# Patient Record
Sex: MALE | Race: WHITE | NOT HISPANIC OR LATINO | Employment: OTHER | RURAL
[De-identification: names, ages, dates, MRNs, and addresses within clinical notes are randomized per-mention and may not be internally consistent; named-entity substitution may affect disease eponyms.]

---

## 2023-07-29 ENCOUNTER — HOSPITAL ENCOUNTER (INPATIENT)
Facility: HOSPITAL | Age: 38
LOS: 5 days | Discharge: HOME | DRG: 558 | End: 2023-08-03
Attending: INTERNAL MEDICINE | Admitting: INTERNAL MEDICINE

## 2023-07-29 DIAGNOSIS — R34 OLIGURIA: ICD-10-CM

## 2023-07-29 DIAGNOSIS — M62.82 RHABDOMYOLYSIS: ICD-10-CM

## 2023-07-29 DIAGNOSIS — R79.89 ELEVATED TROPONIN I LEVEL: ICD-10-CM

## 2023-07-29 DIAGNOSIS — R07.9 CHEST PAIN: ICD-10-CM

## 2023-07-29 DIAGNOSIS — M62.82 NON-TRAUMATIC RHABDOMYOLYSIS: Primary | ICD-10-CM

## 2023-07-29 DIAGNOSIS — I10 ESSENTIAL HYPERTENSION: ICD-10-CM

## 2023-07-29 DIAGNOSIS — R79.89 ELEVATED TROPONIN: ICD-10-CM

## 2023-07-29 DIAGNOSIS — I10 PRIMARY HYPERTENSION: ICD-10-CM

## 2023-07-29 PROBLEM — M25.551 BILATERAL HIP PAIN: Status: ACTIVE | Noted: 2023-07-29

## 2023-07-29 PROBLEM — M25.552 BILATERAL HIP PAIN: Status: ACTIVE | Noted: 2023-07-29

## 2023-07-29 PROBLEM — N17.9 AKI (ACUTE KIDNEY INJURY): Status: ACTIVE | Noted: 2023-07-29

## 2023-07-29 PROBLEM — R74.01 TRANSAMINITIS: Status: ACTIVE | Noted: 2023-07-29

## 2023-07-29 PROBLEM — F10.20 UNCOMPLICATED ALCOHOL DEPENDENCE: Status: ACTIVE | Noted: 2023-07-29

## 2023-07-29 LAB
ALBUMIN SERPL BCP-MCNC: 2.9 G/DL (ref 3.5–5)
ALBUMIN/GLOB SERPL: 0.8 {RATIO}
ALP SERPL-CCNC: 52 U/L (ref 45–115)
ALT SERPL W P-5'-P-CCNC: 452 U/L (ref 16–61)
ANION GAP SERPL CALCULATED.3IONS-SCNC: 15 MMOL/L (ref 7–16)
AST SERPL W P-5'-P-CCNC: 1842 U/L (ref 15–37)
BASOPHILS # BLD AUTO: 0.03 K/UL (ref 0–0.2)
BASOPHILS NFR BLD AUTO: 0.1 % (ref 0–1)
BILIRUB SERPL-MCNC: 1.2 MG/DL (ref ?–1.2)
BUN SERPL-MCNC: 24 MG/DL (ref 7–18)
BUN/CREAT SERPL: 15 (ref 6–20)
CALCIUM SERPL-MCNC: 7.4 MG/DL (ref 8.5–10.1)
CHLORIDE SERPL-SCNC: 92 MMOL/L (ref 98–107)
CO2 SERPL-SCNC: 21 MMOL/L (ref 21–32)
CREAT SERPL-MCNC: 1.56 MG/DL (ref 0.7–1.3)
DIFFERENTIAL METHOD BLD: ABNORMAL
EGFR (NO RACE VARIABLE) (RUSH/TITUS): 58 ML/MIN/1.73M2
EOSINOPHIL # BLD AUTO: 0.09 K/UL (ref 0–0.5)
EOSINOPHIL NFR BLD AUTO: 0.4 % (ref 1–4)
ERYTHROCYTE [DISTWIDTH] IN BLOOD BY AUTOMATED COUNT: 12.7 % (ref 11.5–14.5)
GLOBULIN SER-MCNC: 3.5 G/DL (ref 2–4)
GLUCOSE SERPL-MCNC: 144 MG/DL (ref 74–106)
HCT VFR BLD AUTO: 43.3 % (ref 40–54)
HGB BLD-MCNC: 15.4 G/DL (ref 13.5–18)
IMM GRANULOCYTES # BLD AUTO: 0.16 K/UL (ref 0–0.04)
IMM GRANULOCYTES NFR BLD: 0.7 % (ref 0–0.4)
LACTATE SERPL-SCNC: 2.6 MMOL/L (ref 0.4–2)
LACTATE SERPL-SCNC: 3.2 MMOL/L (ref 0.4–2)
LYMPHOCYTES # BLD AUTO: 0.86 K/UL (ref 1–4.8)
LYMPHOCYTES NFR BLD AUTO: 3.9 % (ref 27–41)
MCH RBC QN AUTO: 31.8 PG (ref 27–31)
MCHC RBC AUTO-ENTMCNC: 35.6 G/DL (ref 32–36)
MCV RBC AUTO: 89.3 FL (ref 80–96)
MONOCYTES # BLD AUTO: 1.7 K/UL (ref 0–0.8)
MONOCYTES NFR BLD AUTO: 7.8 % (ref 2–6)
MPC BLD CALC-MCNC: 9.3 FL (ref 9.4–12.4)
MYOGLOBIN SERPL-MCNC: ABNORMAL NG/ML (ref 16–116)
NEUTROPHILS # BLD AUTO: 18.99 K/UL (ref 1.8–7.7)
NEUTROPHILS NFR BLD AUTO: 87.1 % (ref 53–65)
NRBC # BLD AUTO: 0 X10E3/UL
NRBC, AUTO (.00): 0 %
PLATELET # BLD AUTO: 236 K/UL (ref 150–400)
POTASSIUM SERPL-SCNC: 4.3 MMOL/L (ref 3.5–5.1)
PROT SERPL-MCNC: 6.4 G/DL (ref 6.4–8.2)
RBC # BLD AUTO: 4.85 M/UL (ref 4.6–6.2)
SODIUM SERPL-SCNC: 124 MMOL/L (ref 136–145)
TROPONIN I SERPL DL<=0.01 NG/ML-MCNC: 57 PG/ML
WBC # BLD AUTO: 21.83 K/UL (ref 4.5–11)

## 2023-07-29 PROCEDURE — 93005 ELECTROCARDIOGRAM TRACING: CPT

## 2023-07-29 PROCEDURE — 82550 ASSAY OF CK (CPK): CPT

## 2023-07-29 PROCEDURE — 63600175 PHARM REV CODE 636 W HCPCS

## 2023-07-29 PROCEDURE — 85025 COMPLETE CBC W/AUTO DIFF WBC: CPT

## 2023-07-29 PROCEDURE — 83605 ASSAY OF LACTIC ACID: CPT

## 2023-07-29 PROCEDURE — 83874 ASSAY OF MYOGLOBIN: CPT

## 2023-07-29 PROCEDURE — 11000001 HC ACUTE MED/SURG PRIVATE ROOM

## 2023-07-29 PROCEDURE — 84484 ASSAY OF TROPONIN QUANT: CPT

## 2023-07-29 PROCEDURE — 25000003 PHARM REV CODE 250: Performed by: FAMILY MEDICINE

## 2023-07-29 PROCEDURE — 93010 EKG 12-LEAD: ICD-10-PCS | Mod: ,,, | Performed by: HOSPITALIST

## 2023-07-29 PROCEDURE — 25000003 PHARM REV CODE 250

## 2023-07-29 PROCEDURE — 93010 ELECTROCARDIOGRAM REPORT: CPT | Mod: ,,, | Performed by: HOSPITALIST

## 2023-07-29 PROCEDURE — 80053 COMPREHEN METABOLIC PANEL: CPT

## 2023-07-29 RX ORDER — POLYETHYLENE GLYCOL 3350 17 G/17G
17 POWDER, FOR SOLUTION ORAL 2 TIMES DAILY PRN
Status: DISCONTINUED | OUTPATIENT
Start: 2023-07-29 | End: 2023-08-03 | Stop reason: HOSPADM

## 2023-07-29 RX ORDER — SODIUM CHLORIDE 0.9 % (FLUSH) 0.9 %
10 SYRINGE (ML) INJECTION EVERY 12 HOURS PRN
Status: DISCONTINUED | OUTPATIENT
Start: 2023-07-29 | End: 2023-08-03 | Stop reason: HOSPADM

## 2023-07-29 RX ORDER — IBUPROFEN 200 MG
16 TABLET ORAL
Status: DISCONTINUED | OUTPATIENT
Start: 2023-07-29 | End: 2023-08-03 | Stop reason: HOSPADM

## 2023-07-29 RX ORDER — MORPHINE SULFATE 2 MG/ML
2 INJECTION, SOLUTION INTRAMUSCULAR; INTRAVENOUS ONCE
Status: COMPLETED | OUTPATIENT
Start: 2023-07-29 | End: 2023-07-29

## 2023-07-29 RX ORDER — HYDROCODONE BITARTRATE AND ACETAMINOPHEN 10; 325 MG/1; MG/1
1 TABLET ORAL EVERY 6 HOURS PRN
Status: DISCONTINUED | OUTPATIENT
Start: 2023-07-29 | End: 2023-07-30

## 2023-07-29 RX ORDER — ACETAMINOPHEN 325 MG/1
650 TABLET ORAL EVERY 6 HOURS PRN
Status: DISCONTINUED | OUTPATIENT
Start: 2023-07-29 | End: 2023-07-30

## 2023-07-29 RX ORDER — TALC
6 POWDER (GRAM) TOPICAL NIGHTLY PRN
Status: DISCONTINUED | OUTPATIENT
Start: 2023-07-29 | End: 2023-08-03 | Stop reason: HOSPADM

## 2023-07-29 RX ORDER — MORPHINE SULFATE 4 MG/ML
4 INJECTION, SOLUTION INTRAMUSCULAR; INTRAVENOUS ONCE
Status: COMPLETED | OUTPATIENT
Start: 2023-07-29 | End: 2023-07-29

## 2023-07-29 RX ORDER — MUPIROCIN 20 MG/G
OINTMENT TOPICAL 2 TIMES DAILY
Status: DISCONTINUED | OUTPATIENT
Start: 2023-07-29 | End: 2023-08-03 | Stop reason: HOSPADM

## 2023-07-29 RX ORDER — LORAZEPAM 2 MG/ML
1 INJECTION INTRAMUSCULAR EVERY 4 HOURS PRN
Status: DISCONTINUED | OUTPATIENT
Start: 2023-07-29 | End: 2023-08-01

## 2023-07-29 RX ORDER — ONDANSETRON 2 MG/ML
4 INJECTION INTRAMUSCULAR; INTRAVENOUS EVERY 8 HOURS PRN
Status: DISCONTINUED | OUTPATIENT
Start: 2023-07-29 | End: 2023-08-03 | Stop reason: HOSPADM

## 2023-07-29 RX ORDER — GLUCAGON 1 MG
1 KIT INJECTION
Status: DISCONTINUED | OUTPATIENT
Start: 2023-07-29 | End: 2023-08-03 | Stop reason: HOSPADM

## 2023-07-29 RX ORDER — SODIUM CHLORIDE, SODIUM LACTATE, POTASSIUM CHLORIDE, CALCIUM CHLORIDE 600; 310; 30; 20 MG/100ML; MG/100ML; MG/100ML; MG/100ML
INJECTION, SOLUTION INTRAVENOUS CONTINUOUS
Status: DISCONTINUED | OUTPATIENT
Start: 2023-07-29 | End: 2023-08-02

## 2023-07-29 RX ORDER — IBUPROFEN 200 MG
24 TABLET ORAL
Status: DISCONTINUED | OUTPATIENT
Start: 2023-07-29 | End: 2023-08-03 | Stop reason: HOSPADM

## 2023-07-29 RX ORDER — NALOXONE HCL 0.4 MG/ML
0.02 VIAL (ML) INJECTION
Status: DISCONTINUED | OUTPATIENT
Start: 2023-07-29 | End: 2023-08-03 | Stop reason: HOSPADM

## 2023-07-29 RX ADMIN — HYDROCODONE BITARTRATE AND ACETAMINOPHEN 1 TABLET: 10; 325 TABLET ORAL at 06:07

## 2023-07-29 RX ADMIN — SODIUM CHLORIDE, POTASSIUM CHLORIDE, SODIUM LACTATE AND CALCIUM CHLORIDE: 600; 310; 30; 20 INJECTION, SOLUTION INTRAVENOUS at 07:07

## 2023-07-29 RX ADMIN — MORPHINE SULFATE 4 MG: 4 INJECTION, SOLUTION INTRAMUSCULAR; INTRAVENOUS at 10:07

## 2023-07-29 RX ADMIN — MORPHINE SULFATE 2 MG: 2 INJECTION, SOLUTION INTRAMUSCULAR; INTRAVENOUS at 08:07

## 2023-07-29 RX ADMIN — MUPIROCIN: 20 OINTMENT TOPICAL at 08:07

## 2023-07-29 RX ADMIN — SODIUM CHLORIDE, POTASSIUM CHLORIDE, SODIUM LACTATE AND CALCIUM CHLORIDE: 600; 310; 30; 20 INJECTION, SOLUTION INTRAVENOUS at 10:07

## 2023-07-29 NOTE — H&P
[facility] - [address] [place] [address] Medicine  History & Physical    Patient Name: Raj Watkins  MRN: [id number]  Patient Class: IP- Inpatient  Admission Date: 7/29/2023  Attending Physician: Teto Azar DO   Primary Care Provider: Primary Doctor No         Patient information was obtained from patient, past medical records and ER records.     Subjective:     Principal Problem:Rhabdomyolysis    Chief Complaint:   Chief Complaint   Patient presents with    Hip Pain     Rhabdomyolysis        HPI: Patient is a 38 year old male with a past medical history of diabetes mellitus, low back pain, and hypertension who is a direct admit of [facility]. Patient presents with bilateral hip pain following a fall. He states that last night he starting thing about an ongoing family issue and started drinking. He also endorsed using methamphetamines. Of note, patient has a history of IVDU specifically methamphetamine, as well as, alcohol use. He states he has been drinking about 7-9 beers per night. Last night, after drinking and using methamphetamines he got up to go to the restroom and fell into the tub. Denies any head trauma. Patient estimates he was unconscious throughout the night (approximately 4 + hours) in a hot bathroom. When he awoke his legs were crossed and hanging over the edge of the tub. Upon attempting to sit up on the edge of the tube he noted severe bilateral hip pain. EMS was alerted and he was transport to [facility] emergency department.     In the ED at [facility], his vital were /81, T 36.4 C. Labs were significant for WBC 24575, , CK > 1000, myoglobin > 1000. Patient was given 3L NS, magnesium sulfate 4g IV, and cath.     In the ambulance, patient put out 1700 ml of dark brown urine, was given Fentanyl 100, and Zofran 4mg. Patient was admitted directly to the hospialist service at [facility]           No past medical history on  file.    No past surgical history on file.    Review of patient's allergies indicates:  Not on File    No current facility-administered medications on file prior to encounter.     No current outpatient medications on file prior to encounter.     Family History    None       Tobacco Use    Smoking status: Not on file    Smokeless tobacco: Not on file   Substance and Sexual Activity    Alcohol use: Not on file    Drug use: Not on file    Sexual activity: Not on file     Review of Systems   Constitutional:  Negative for activity change, appetite change and fever.   HENT:  Negative for postnasal drip, rhinorrhea and sinus pressure.    Respiratory:  Negative for cough, chest tightness and shortness of breath.    Cardiovascular:  Positive for leg swelling. Negative for chest pain and palpitations.   Gastrointestinal:  Negative for abdominal distention, abdominal pain, constipation and diarrhea.   Genitourinary:  Positive for difficulty urinating. Negative for enuresis and frequency.   Musculoskeletal:  Positive for arthralgias, back pain, gait problem and myalgias.   Skin:  Negative for rash and wound.   Allergic/Immunologic: Negative for environmental allergies and food allergies.   Neurological:  Positive for weakness. Negative for speech difficulty, light-headedness and headaches.   Psychiatric/Behavioral:  Negative for agitation, behavioral problems and hallucinations. The patient is nervous/anxious. The patient is not hyperactive.    All other systems reviewed and are negative.    Objective:     Vital Signs (Most Recent):  Temp: 98 °F (36.7 °C) (07/29/23 1700)  Pulse: 87 (07/29/23 1700)  Resp: 20 (07/29/23 1700)  BP: 131/87 (07/29/23 1700)  SpO2: 96 % (07/29/23 1700) Vital Signs (24h Range):  Temp:  [98 °F (36.7 °C)] 98 °F (36.7 °C)  Pulse:  [87] 87  Resp:  [20] 20  SpO2:  [96 %] 96 %  BP: (131)/(87) 131/87        There is no height or weight on file to calculate BMI.     Physical Exam  Vitals reviewed.    Constitutional:       General: He is awake. He is not in acute distress.     Appearance: Normal appearance. He is well-developed, well-groomed and overweight. He is ill-appearing.   HENT:      Head: Normocephalic and atraumatic.      Mouth/Throat:      Mouth: Mucous membranes are dry.      Pharynx: Oropharynx is clear.   Eyes:      General:         Right eye: No discharge.         Left eye: No discharge.      Conjunctiva/sclera: Conjunctivae normal.      Pupils: Pupils are equal, round, and reactive to light.   Cardiovascular:      Rate and Rhythm: Normal rate and regular rhythm.      Pulses: Normal pulses.      Heart sounds: Normal heart sounds.   Pulmonary:      Effort: Pulmonary effort is normal. No respiratory distress.      Breath sounds: Normal breath sounds.   Abdominal:      General: Bowel sounds are normal.      Palpations: Abdomen is soft.      Tenderness: There is no abdominal tenderness.   Musculoskeletal:      Right hip: Tenderness present. No bony tenderness. Decreased range of motion.      Left hip: Tenderness present. No bony tenderness. Decreased range of motion.      Right upper leg: Tenderness present. No lacerations or bony tenderness.      Left upper leg: Tenderness present. No lacerations or bony tenderness.      Right lower leg: Swelling and tenderness present. No deformity. Edema present.      Left lower leg: Swelling and tenderness present. No deformity. Edema present.   Skin:     General: Skin is warm and dry.   Neurological:      Mental Status: He is alert and oriented to person, place, and time.   Psychiatric:         Mood and Affect: Mood normal.         Behavior: Behavior is cooperative.              CRANIAL NERVES     CN III, IV, VI   Pupils are equal, round, and reactive to light.       Significant Labs: All pertinent labs within the past 24 hours have been reviewed.    Significant Imaging: I have reviewed all pertinent imaging results/findings within the past 24  hours.    Assessment/Plan:     * Rhabdomyolysis  Transfer from [facility], Myoglobin >1000, CK >1000  Reported 3L NS given at [facility]  Trend CK and Myoglobin  IVF resuscitation,  ml/hr    Elevated troponin I level  Troponin at [facility] 94.0  Trend troponin   No chest pain      YANNICK (acute kidney injury)  Patient with acute kidney injury/acute renal failure likely due to pre-renal azotemia due to dehydration YANNICK is currently stable. Baseline creatinine unknown - Labs reviewed- Renal function/electrolytes with CrCl cannot be calculated (No successful lab value found.). according to latest data. Monitor urine output and serial BMP and adjust therapy as needed. Avoid nephrotoxins and renally dose meds for GFR listed above.    Creatinine 2.15 at [facility]     Transaminitis  At [facility] AST 2026,   Repeat CMP      Bilateral hip pain  Patient reportedly fell at home in Bathtub for unknown amount of time  Back and Hip pain along with rhabdomyolysis  CT Cervical, Thoracic, Lumbar and Pelvis without contrast - pending      Primary hypertension  Holding home lisinopril-HCTZ due to YANNICK  BP well controlled      Uncomplicated alcohol dependence  Patient reportedly drinking >6 beers per day  Ativan PRN for withdrawal   CIWA score 1 point      VTE Risk Mitigation (From admission, onward)         Ordered     IP VTE LOW RISK PATIENT  Once         07/29/23 1745     Place sequential compression device  Until discontinued         07/29/23 1745                           Ivy Howell MD  Department of Hospital Medicine  [facility] - [address] [place] [address]

## 2023-07-29 NOTE — SUBJECTIVE & OBJECTIVE
No past medical history on file.    No past surgical history on file.    Review of patient's allergies indicates:  Not on File    No current facility-administered medications on file prior to encounter.     No current outpatient medications on file prior to encounter.     Family History    None       Tobacco Use    Smoking status: Not on file    Smokeless tobacco: Not on file   Substance and Sexual Activity    Alcohol use: Not on file    Drug use: Not on file    Sexual activity: Not on file     Review of Systems   Constitutional:  Negative for activity change, appetite change and fever.   HENT:  Negative for postnasal drip, rhinorrhea and sinus pressure.    Respiratory:  Negative for cough, chest tightness and shortness of breath.    Cardiovascular:  Positive for leg swelling. Negative for chest pain and palpitations.   Gastrointestinal:  Negative for abdominal distention, abdominal pain, constipation and diarrhea.   Genitourinary:  Positive for difficulty urinating. Negative for enuresis and frequency.   Musculoskeletal:  Positive for arthralgias, back pain, gait problem and myalgias.   Skin:  Negative for rash and wound.   Allergic/Immunologic: Negative for environmental allergies and food allergies.   Neurological:  Positive for weakness. Negative for speech difficulty, light-headedness and headaches.   Psychiatric/Behavioral:  Negative for agitation, behavioral problems and hallucinations. The patient is nervous/anxious. The patient is not hyperactive.    All other systems reviewed and are negative.    Objective:     Vital Signs (Most Recent):  Temp: 98 °F (36.7 °C) (07/29/23 1700)  Pulse: 87 (07/29/23 1700)  Resp: 20 (07/29/23 1700)  BP: 131/87 (07/29/23 1700)  SpO2: 96 % (07/29/23 1700) Vital Signs (24h Range):  Temp:  [98 °F (36.7 °C)] 98 °F (36.7 °C)  Pulse:  [87] 87  Resp:  [20] 20  SpO2:  [96 %] 96 %  BP: (131)/(87) 131/87        There is no height or weight on file to calculate BMI.     Physical  Exam  Vitals reviewed.   Constitutional:       General: He is awake. He is not in acute distress.     Appearance: Normal appearance. He is well-developed, well-groomed and overweight. He is ill-appearing.   HENT:      Head: Normocephalic and atraumatic.      Mouth/Throat:      Mouth: Mucous membranes are dry.      Pharynx: Oropharynx is clear.   Eyes:      General:         Right eye: No discharge.         Left eye: No discharge.      Conjunctiva/sclera: Conjunctivae normal.      Pupils: Pupils are equal, round, and reactive to light.   Cardiovascular:      Rate and Rhythm: Normal rate and regular rhythm.      Pulses: Normal pulses.      Heart sounds: Normal heart sounds.   Pulmonary:      Effort: Pulmonary effort is normal. No respiratory distress.      Breath sounds: Normal breath sounds.   Abdominal:      General: Bowel sounds are normal.      Palpations: Abdomen is soft.      Tenderness: There is no abdominal tenderness.   Musculoskeletal:      Right hip: Tenderness present. No bony tenderness. Decreased range of motion.      Left hip: Tenderness present. No bony tenderness. Decreased range of motion.      Right upper leg: Tenderness present. No lacerations or bony tenderness.      Left upper leg: Tenderness present. No lacerations or bony tenderness.      Right lower leg: Swelling and tenderness present. No deformity. Edema present.      Left lower leg: Swelling and tenderness present. No deformity. Edema present.   Skin:     General: Skin is warm and dry.   Neurological:      Mental Status: He is alert and oriented to person, place, and time.   Psychiatric:         Mood and Affect: Mood normal.         Behavior: Behavior is cooperative.              CRANIAL NERVES     CN III, IV, VI   Pupils are equal, round, and reactive to light.       Significant Labs: All pertinent labs within the past 24 hours have been reviewed.    Significant Imaging: I have reviewed all pertinent imaging results/findings within the past  24 hours.

## 2023-07-29 NOTE — ASSESSMENT & PLAN NOTE
Patient with acute kidney injury/acute renal failure likely due to pre-renal azotemia due to dehydration YANNICK is currently stable. Baseline creatinine unknown - Labs reviewed- Renal function/electrolytes with CrCl cannot be calculated (No successful lab value found.). according to latest data. Monitor urine output and serial BMP and adjust therapy as needed. Avoid nephrotoxins and renally dose meds for GFR listed above.    Creatinine 2.15 at [facility]

## 2023-07-29 NOTE — ASSESSMENT & PLAN NOTE
Patient reportedly fell at home in Bathtub for unknown amount of time  Back and Hip pain along with rhabdomyolysis  CT Cervical, Thoracic, Lumbar and Pelvis without contrast - pending

## 2023-07-29 NOTE — HPI
Patient is a 38 year old male with a past medical history of diabetes mellitus, low back pain, and hypertension who is a direct admit of [facility]. Patient presents with bilateral hip pain following a fall. He states that last night he starting thing about an ongoing family issue and started drinking. He also endorsed using methamphetamines. Of note, patient has a history of IVDU specifically methamphetamine, as well as, alcohol use. He states he has been drinking about 7-9 beers per night. Last night, after drinking and using methamphetamines he got up to go to the restroom and fell into the tub. Denies any head trauma. Patient estimates he was unconscious throughout the night (approximately 4 + hours) in a hot bathroom. When he awoke his legs were crossed and hanging over the edge of the tub. Upon attempting to sit up on the edge of the tube he noted severe bilateral hip pain. EMS was alerted and he was transport to [facility] emergency department.     In the ED at [facility], his vital were /81, T 36.4 C. Labs were significant for WBC 69249, , CK > 1000, myoglobin > 1000. Patient was given 3L NS, magnesium sulfate 4g IV, and cath.     In the ambulance, patient put out 1700 ml of dark brown urine, was given Fentanyl 100, and Zofran 4mg. Patient was admitted directly to the hospialist service at [facility]

## 2023-07-29 NOTE — ASSESSMENT & PLAN NOTE
Transfer from [facility], Myoglobin >1000, CK >1000  Reported 3L NS given at [facility]  Trend CK and Myoglobin  IVF resuscitation,  ml/hr

## 2023-07-30 LAB
ALBUMIN SERPL BCP-MCNC: 2.8 G/DL (ref 3.5–5)
ALBUMIN/GLOB SERPL: 0.9 {RATIO}
ALP SERPL-CCNC: 50 U/L (ref 45–115)
ALT SERPL W P-5'-P-CCNC: 486 U/L (ref 16–61)
ANION GAP SERPL CALCULATED.3IONS-SCNC: 12 MMOL/L (ref 7–16)
AORTIC ROOT ANNULUS: 3.95 CM
AORTIC VALVE CUSP SEPERATION: 2.45 CM
AST SERPL W P-5'-P-CCNC: 1747 U/L (ref 15–37)
AV MEAN GRADIENT: 7 MMHG
AV PEAK GRADIENT: 13 MMHG
BASOPHILS # BLD AUTO: 0.03 K/UL (ref 0–0.2)
BASOPHILS NFR BLD AUTO: 0.1 % (ref 0–1)
BILIRUB SERPL-MCNC: 1.2 MG/DL (ref ?–1.2)
BSA FOR ECHO PROCEDURE: 2.52 M2
BUN SERPL-MCNC: 21 MG/DL (ref 7–18)
BUN/CREAT SERPL: 14 (ref 6–20)
CALCIUM SERPL-MCNC: 7.4 MG/DL (ref 8.5–10.1)
CHLORIDE SERPL-SCNC: 92 MMOL/L (ref 98–107)
CK SERPL-CCNC: ABNORMAL U/L (ref 39–308)
CO2 SERPL-SCNC: 26 MMOL/L (ref 21–32)
CREAT SERPL-MCNC: 1.48 MG/DL (ref 0.7–1.3)
CV ECHO LV RWT: 0.35 CM
DIFFERENTIAL METHOD BLD: ABNORMAL
DOP CALC AO PEAK VEL: 1.79 M/S
DOP CALC AO VTI: 28.4 CM
DOP CALC LVOT AREA: 3.4 CM2
DOP CALC LVOT DIAMETER: 2.09 CM
E WAVE DECELERATION TIME: 235.99 MSEC
E/A RATIO: 1.1
E/E' RATIO: 8 M/S
ECHO LV POSTERIOR WALL: 0.88 CM (ref 0.6–1.1)
EGFR (NO RACE VARIABLE) (RUSH/TITUS): 61 ML/MIN/1.73M2
EJECTION FRACTION: 65 %
EOSINOPHIL # BLD AUTO: 0 K/UL (ref 0–0.5)
EOSINOPHIL NFR BLD AUTO: 0 % (ref 1–4)
ERYTHROCYTE [DISTWIDTH] IN BLOOD BY AUTOMATED COUNT: 12.8 % (ref 11.5–14.5)
FRACTIONAL SHORTENING: 34 % (ref 28–44)
GLOBULIN SER-MCNC: 3 G/DL (ref 2–4)
GLUCOSE SERPL-MCNC: 119 MG/DL (ref 74–106)
HCT VFR BLD AUTO: 43.7 % (ref 40–54)
HGB BLD-MCNC: 14.7 G/DL (ref 13.5–18)
IMM GRANULOCYTES # BLD AUTO: 0.11 K/UL (ref 0–0.04)
IMM GRANULOCYTES NFR BLD: 0.5 % (ref 0–0.4)
INTERVENTRICULAR SEPTUM: 1.14 CM (ref 0.6–1.1)
IVC: 1.5 CM
LACTATE SERPL-SCNC: 3 MMOL/L (ref 0.4–2)
LACTATE SERPL-SCNC: 3 MMOL/L (ref 0.4–2)
LEFT INTERNAL DIMENSION IN SYSTOLE: 3.31 CM (ref 2.1–4)
LEFT VENTRICLE DIASTOLIC VOLUME INDEX: 49.03 ML/M2
LEFT VENTRICLE DIASTOLIC VOLUME: 121.1 ML
LEFT VENTRICLE MASS INDEX: 76 G/M2
LEFT VENTRICLE SYSTOLIC VOLUME INDEX: 18 ML/M2
LEFT VENTRICLE SYSTOLIC VOLUME: 44.46 ML
LEFT VENTRICULAR INTERNAL DIMENSION IN DIASTOLE: 5.05 CM (ref 3.5–6)
LEFT VENTRICULAR MASS: 187.47 G
LV LATERAL E/E' RATIO: 8 M/S
LV SEPTAL E/E' RATIO: 8 M/S
LYMPHOCYTES # BLD AUTO: 1.13 K/UL (ref 1–4.8)
LYMPHOCYTES NFR BLD AUTO: 5.5 % (ref 27–41)
MCH RBC QN AUTO: 31.7 PG (ref 27–31)
MCHC RBC AUTO-ENTMCNC: 33.6 G/DL (ref 32–36)
MCV RBC AUTO: 94.2 FL (ref 80–96)
MONOCYTES # BLD AUTO: 1.71 K/UL (ref 0–0.8)
MONOCYTES NFR BLD AUTO: 8.3 % (ref 2–6)
MPC BLD CALC-MCNC: 9.4 FL (ref 9.4–12.4)
MV PEAK A VEL: 0.8 M/S
MV PEAK E VEL: 0.88 M/S
NEUTROPHILS # BLD AUTO: 17.72 K/UL (ref 1.8–7.7)
NEUTROPHILS NFR BLD AUTO: 85.6 % (ref 53–65)
NRBC # BLD AUTO: 0 X10E3/UL
NRBC, AUTO (.00): 0 %
PLATELET # BLD AUTO: 207 K/UL (ref 150–400)
POTASSIUM SERPL-SCNC: 4.1 MMOL/L (ref 3.5–5.1)
PROT SERPL-MCNC: 5.8 G/DL (ref 6.4–8.2)
PV PEAK GRADIENT: 10 MMHG
PV PEAK VELOCITY: 1.6 M/S
RBC # BLD AUTO: 4.64 M/UL (ref 4.6–6.2)
RIGHT ATRIAL AREA: 17 CM2
RIGHT VENTRICLE DIASTOLIC BASEL DIMENSION: 3.1 CM
SODIUM SERPL-SCNC: 126 MMOL/L (ref 136–145)
TDI LATERAL: 0.11 M/S
TDI SEPTAL: 0.11 M/S
TDI: 0.11 M/S
TROPONIN I SERPL DL<=0.01 NG/ML-MCNC: 31.2 PG/ML
WBC # BLD AUTO: 20.7 K/UL (ref 4.5–11)
Z-SCORE OF LEFT VENTRICULAR DIMENSION IN END DIASTOLE: -9.38
Z-SCORE OF LEFT VENTRICULAR DIMENSION IN END SYSTOLE: -6.65

## 2023-07-30 PROCEDURE — 84484 ASSAY OF TROPONIN QUANT: CPT

## 2023-07-30 PROCEDURE — 63600175 PHARM REV CODE 636 W HCPCS

## 2023-07-30 PROCEDURE — 99232 PR SUBSEQUENT HOSPITAL CARE,LEVL II: ICD-10-PCS | Mod: ,,, | Performed by: FAMILY MEDICINE

## 2023-07-30 PROCEDURE — 63600175 PHARM REV CODE 636 W HCPCS: Performed by: FAMILY MEDICINE

## 2023-07-30 PROCEDURE — 83605 ASSAY OF LACTIC ACID: CPT

## 2023-07-30 PROCEDURE — 25000003 PHARM REV CODE 250

## 2023-07-30 PROCEDURE — 25000003 PHARM REV CODE 250: Performed by: FAMILY MEDICINE

## 2023-07-30 PROCEDURE — 99232 SBSQ HOSP IP/OBS MODERATE 35: CPT | Mod: ,,, | Performed by: FAMILY MEDICINE

## 2023-07-30 PROCEDURE — 85025 COMPLETE CBC W/AUTO DIFF WBC: CPT

## 2023-07-30 PROCEDURE — 11000001 HC ACUTE MED/SURG PRIVATE ROOM

## 2023-07-30 PROCEDURE — 80053 COMPREHEN METABOLIC PANEL: CPT

## 2023-07-30 RX ORDER — CHLORDIAZEPOXIDE HYDROCHLORIDE 25 MG/1
25 CAPSULE, GELATIN COATED ORAL EVERY 8 HOURS
Status: DISCONTINUED | OUTPATIENT
Start: 2023-07-30 | End: 2023-07-30

## 2023-07-30 RX ORDER — CLONAZEPAM 0.5 MG/1
2 TABLET ORAL 2 TIMES DAILY
Status: DISCONTINUED | OUTPATIENT
Start: 2023-07-30 | End: 2023-07-30

## 2023-07-30 RX ORDER — CHLORDIAZEPOXIDE HYDROCHLORIDE 25 MG/1
25 CAPSULE, GELATIN COATED ORAL EVERY 6 HOURS
Status: DISCONTINUED | OUTPATIENT
Start: 2023-07-30 | End: 2023-07-31

## 2023-07-30 RX ORDER — MORPHINE SULFATE 2 MG/ML
2 INJECTION, SOLUTION INTRAMUSCULAR; INTRAVENOUS EVERY 4 HOURS PRN
Status: DISCONTINUED | OUTPATIENT
Start: 2023-07-30 | End: 2023-08-02

## 2023-07-30 RX ADMIN — MUPIROCIN: 20 OINTMENT TOPICAL at 09:07

## 2023-07-30 RX ADMIN — CHLORDIAZEPOXIDE HYDROCHLORIDE 25 MG: 25 CAPSULE ORAL at 05:07

## 2023-07-30 RX ADMIN — SODIUM CHLORIDE, POTASSIUM CHLORIDE, SODIUM LACTATE AND CALCIUM CHLORIDE: 600; 310; 30; 20 INJECTION, SOLUTION INTRAVENOUS at 10:07

## 2023-07-30 RX ADMIN — SODIUM CHLORIDE, POTASSIUM CHLORIDE, SODIUM LACTATE AND CALCIUM CHLORIDE: 600; 310; 30; 20 INJECTION, SOLUTION INTRAVENOUS at 02:07

## 2023-07-30 RX ADMIN — MORPHINE SULFATE 2 MG: 2 INJECTION, SOLUTION INTRAMUSCULAR; INTRAVENOUS at 06:07

## 2023-07-30 RX ADMIN — LORAZEPAM 1 MG: 2 INJECTION INTRAMUSCULAR; INTRAVENOUS at 03:07

## 2023-07-30 RX ADMIN — SODIUM CHLORIDE, POTASSIUM CHLORIDE, SODIUM LACTATE AND CALCIUM CHLORIDE: 600; 310; 30; 20 INJECTION, SOLUTION INTRAVENOUS at 03:07

## 2023-07-30 RX ADMIN — CHLORDIAZEPOXIDE HYDROCHLORIDE 25 MG: 25 CAPSULE ORAL at 11:07

## 2023-07-30 RX ADMIN — THIAMINE HYDROCHLORIDE: 100 INJECTION, SOLUTION INTRAMUSCULAR; INTRAVENOUS at 05:07

## 2023-07-30 RX ADMIN — MORPHINE SULFATE 2 MG: 2 INJECTION, SOLUTION INTRAMUSCULAR; INTRAVENOUS at 11:07

## 2023-07-30 RX ADMIN — SODIUM CHLORIDE, POTASSIUM CHLORIDE, SODIUM LACTATE AND CALCIUM CHLORIDE: 600; 310; 30; 20 INJECTION, SOLUTION INTRAVENOUS at 07:07

## 2023-07-30 NOTE — PROGRESS NOTES
[facility] - [address] [place] [address] Medicine  Progress Note    Patient Name: Raj Watkins  MRN: [id number]  Patient Class: IP- Inpatient   Admission Date: 7/29/2023  Length of Stay: 1 days  Attending Physician: Teto Azar DO  Primary Care Provider: Primary Doctor No        Subjective:     Principal Problem:Rhabdomyolysis        HPI:  Patient is a 38 year old male with a past medical history of diabetes mellitus, low back pain, and hypertension who is a direct admit of [facility]. Patient presents with bilateral hip pain following a fall. He states that last night he starting thing about an ongoing family issue and started drinking. He also endorsed using methamphetamines. Of note, patient has a history of IVDU specifically methamphetamine, as well as, alcohol use. He states he has been drinking about 7-9 beers per night. Last night, after drinking and using methamphetamines he got up to go to the restroom and fell into the tub. Denies any head trauma. Patient estimates he was unconscious throughout the night (approximately 4 + hours) in a hot bathroom. When he awoke his legs were crossed and hanging over the edge of the tub. Upon attempting to sit up on the edge of the tube he noted severe bilateral hip pain. EMS was alerted and he was transport to [facility] emergency department.     In the ED at [facility], his vital were /81, T 36.4 C. Labs were significant for WBC 56435, , CK > 1000, myoglobin > 1000. Patient was given 3L NS, magnesium sulfate 4g IV, and cath.     In the ambulance, patient put out 1700 ml of dark brown urine, was given Fentanyl 100, and Zofran 4mg. Patient was admitted directly to the hospialist service at [facility]           Overview/Hospital Course:  No notes on file    No new subjective & objective note has been filed under this hospital service since the last note was generated.      Assessment/Plan:      *  Rhabdomyolysis  Transfer from [facility], Myoglobin >1000, CK >1000  Reported 3L NS given at [facility]  Trend CK and Myoglobin  IVF resuscitation,  ml/hr    7/30  - repeat labs showed ck 66,000+, myoglobin 23,000+ with elevated liver enzymes (, ALT 1,842) and elevate wbc  - given 4L LR at 250/hr; con't iv hydration      Elevated troponin I level  Troponin at [facility] 94.0  Trend troponin   No chest pain    7/30  - repeat troponins trending down  - no chest pain   - echo showed normal right ventricle and aortic valve with no pericardial effusion  - telemetry showed normal sinus rhythm, con't monitoring    YANNICK (acute kidney injury)  Patient with acute kidney injury/acute renal failure likely due to pre-renal azotemia due to dehydration YANNICK is currently stable. Baseline creatinine unknown - Labs reviewed- Renal function/electrolytes with Estimated Creatinine Clearance: 93.5 mL/min (A) (based on SCr of 1.48 mg/dL (H)). according to latest data. Monitor urine output and serial BMP and adjust therapy as needed. Avoid nephrotoxins and renally dose meds for GFR listed above.    Creatinine 2.15 at [facility]     7/30  - likely secondary to rhabdomyolysis vs dehydration  - con't ivf  - cr stable     Transaminitis  At [facility] AST 2026,   Repeat CMP      Bilateral hip pain  7/30  Patient reportedly fell at home in Bathtub for unknown amount of time  Back and Hip pain along with rhabdomyolysis  CT Cervical, Thoracic and Pelvis without contrast - negative for any acute process.   Lumbar CT - Multilevel degenerative change of the lumbar spine.       Primary hypertension  7/30  Holding home lisinopril-HCTZ due to YANNICK  BP well controlled      Uncomplicated alcohol dependence  Patient reportedly drinking >6 beers per day  Ativan PRN for withdrawal   CIWA score 1 point    7/30  - continue ativan prn for withdrawal  - start chlordiazepoxide 25 q8hr; coverage for withdrawal/anxiety      VTE Risk Mitigation (From  admission, onward)         Ordered     IP VTE LOW RISK PATIENT  Once         07/29/23 1745     Place sequential compression device  Until discontinued         07/29/23 1745                Discharge Planning   DONNA:      Code Status: Full Code   Is the patient medically ready for discharge?:     Reason for patient still in hospital (select all that apply): Treatment                     Ivy Howell MD  Department of Hospital Medicine   [facility] - [facility] [address] [facility] Tele[facility]

## 2023-07-30 NOTE — ASSESSMENT & PLAN NOTE
Patient with acute kidney injury/acute renal failure likely due to pre-renal azotemia due to dehydration YANNICK is currently stable. Baseline creatinine unknown - Labs reviewed- Renal function/electrolytes with Estimated Creatinine Clearance: 93.5 mL/min (A) (based on SCr of 1.48 mg/dL (H)). according to latest data. Monitor urine output and serial BMP and adjust therapy as needed. Avoid nephrotoxins and renally dose meds for GFR listed above.    Creatinine 2.15 at [facility]     7/30  - likely secondary to rhabdomyolysis vs dehydration  - con't ivf  - cr stable

## 2023-07-30 NOTE — ASSESSMENT & PLAN NOTE
7/30  Patient reportedly fell at home in Bathtub for unknown amount of time  Back and Hip pain along with rhabdomyolysis  CT Cervical, Thoracic and Pelvis without contrast - negative for any acute process.   Lumbar CT - Multilevel degenerative change of the lumbar spine.      no

## 2023-07-30 NOTE — ASSESSMENT & PLAN NOTE
Troponin at [facility] 94.0  Trend troponin   No chest pain    7/30  - repeat troponins trending down  - no chest pain   - echo showed normal right ventricle and aortic valve with no pericardial effusion  - telemetry showed normal sinus rhythm, con't monitoring

## 2023-07-30 NOTE — ASSESSMENT & PLAN NOTE
Patient reportedly drinking >6 beers per day  Ativan PRN for withdrawal   CIWA score 1 point    7/30  - continue ativan prn for withdrawal  - start chlordiazepoxide 25 q8hr; coverage for withdrawal/anxiety

## 2023-07-30 NOTE — ASSESSMENT & PLAN NOTE
Transfer from [facility], Myoglobin >1000, CK >1000  Reported 3L NS given at [facility]  Trend CK and Myoglobin  IVF resuscitation,  ml/hr    7/30  - repeat labs showed ck 66,000+, myoglobin 23,000+ with elevated liver enzymes (, ALT 1,842) and elevate wbc  - given 4L LR at 250/hr; con't iv hydration

## 2023-07-31 PROBLEM — I10 ESSENTIAL HYPERTENSION: Status: ACTIVE | Noted: 2018-06-25

## 2023-07-31 PROBLEM — M62.82 RHABDOMYOLYSIS: Status: ACTIVE | Noted: 2023-07-31

## 2023-07-31 LAB
ALBUMIN SERPL BCP-MCNC: 2 G/DL (ref 3.5–5)
ALBUMIN/GLOB SERPL: 0.5 {RATIO}
ALP SERPL-CCNC: 49 U/L (ref 45–115)
ALT SERPL W P-5'-P-CCNC: 361 U/L (ref 16–61)
ANION GAP SERPL CALCULATED.3IONS-SCNC: 13 MMOL/L (ref 7–16)
AST SERPL W P-5'-P-CCNC: 1121 U/L (ref 15–37)
BASOPHILS # BLD AUTO: 0.03 K/UL (ref 0–0.2)
BASOPHILS NFR BLD AUTO: 0.2 % (ref 0–1)
BILIRUB SERPL-MCNC: 1 MG/DL (ref ?–1.2)
BUN SERPL-MCNC: 16 MG/DL (ref 7–18)
BUN/CREAT SERPL: 14 (ref 6–20)
CALCIUM SERPL-MCNC: 8.1 MG/DL (ref 8.5–10.1)
CHLORIDE SERPL-SCNC: 93 MMOL/L (ref 98–107)
CK SERPL-CCNC: ABNORMAL U/L (ref 39–308)
CO2 SERPL-SCNC: 22 MMOL/L (ref 21–32)
CREAT SERPL-MCNC: 1.16 MG/DL (ref 0.7–1.3)
DIFFERENTIAL METHOD BLD: ABNORMAL
EGFR (NO RACE VARIABLE) (RUSH/TITUS): 82 ML/MIN/1.73M2
EOSINOPHIL # BLD AUTO: 0.01 K/UL (ref 0–0.5)
EOSINOPHIL NFR BLD AUTO: 0.1 % (ref 1–4)
ERYTHROCYTE [DISTWIDTH] IN BLOOD BY AUTOMATED COUNT: 12.8 % (ref 11.5–14.5)
GLOBULIN SER-MCNC: 4.1 G/DL (ref 2–4)
GLUCOSE SERPL-MCNC: 135 MG/DL (ref 74–106)
HCT VFR BLD AUTO: 44.8 % (ref 40–54)
HGB BLD-MCNC: 14.6 G/DL (ref 13.5–18)
IMM GRANULOCYTES # BLD AUTO: 0.12 K/UL (ref 0–0.04)
IMM GRANULOCYTES NFR BLD: 0.9 % (ref 0–0.4)
LYMPHOCYTES # BLD AUTO: 0.95 K/UL (ref 1–4.8)
LYMPHOCYTES NFR BLD AUTO: 7 % (ref 27–41)
MCH RBC QN AUTO: 31.7 PG (ref 27–31)
MCHC RBC AUTO-ENTMCNC: 32.6 G/DL (ref 32–36)
MCV RBC AUTO: 97.2 FL (ref 80–96)
MONOCYTES # BLD AUTO: 1.09 K/UL (ref 0–0.8)
MONOCYTES NFR BLD AUTO: 8 % (ref 2–6)
MPC BLD CALC-MCNC: 9.9 FL (ref 9.4–12.4)
MYOGLOBIN SERPL-MCNC: 5787 NG/ML (ref 16–116)
NEUTROPHILS # BLD AUTO: 11.45 K/UL (ref 1.8–7.7)
NEUTROPHILS NFR BLD AUTO: 83.8 % (ref 53–65)
NRBC # BLD AUTO: 0 X10E3/UL
NRBC, AUTO (.00): 0 %
PLATELET # BLD AUTO: 157 K/UL (ref 150–400)
POTASSIUM SERPL-SCNC: 4.2 MMOL/L (ref 3.5–5.1)
PROT SERPL-MCNC: 6.1 G/DL (ref 6.4–8.2)
RBC # BLD AUTO: 4.61 M/UL (ref 4.6–6.2)
SODIUM SERPL-SCNC: 124 MMOL/L (ref 136–145)
WBC # BLD AUTO: 13.65 K/UL (ref 4.5–11)

## 2023-07-31 PROCEDURE — 85025 COMPLETE CBC W/AUTO DIFF WBC: CPT

## 2023-07-31 PROCEDURE — 97161 PT EVAL LOW COMPLEX 20 MIN: CPT

## 2023-07-31 PROCEDURE — 80053 COMPREHEN METABOLIC PANEL: CPT

## 2023-07-31 PROCEDURE — 25000003 PHARM REV CODE 250: Performed by: STUDENT IN AN ORGANIZED HEALTH CARE EDUCATION/TRAINING PROGRAM

## 2023-07-31 PROCEDURE — 83874 ASSAY OF MYOGLOBIN: CPT

## 2023-07-31 PROCEDURE — 63600175 PHARM REV CODE 636 W HCPCS: Performed by: FAMILY MEDICINE

## 2023-07-31 PROCEDURE — 25000003 PHARM REV CODE 250

## 2023-07-31 PROCEDURE — 99233 SBSQ HOSP IP/OBS HIGH 50: CPT | Mod: GC,,, | Performed by: STUDENT IN AN ORGANIZED HEALTH CARE EDUCATION/TRAINING PROGRAM

## 2023-07-31 PROCEDURE — 11000001 HC ACUTE MED/SURG PRIVATE ROOM

## 2023-07-31 PROCEDURE — 82550 ASSAY OF CK (CPK): CPT

## 2023-07-31 PROCEDURE — 25000003 PHARM REV CODE 250: Performed by: FAMILY MEDICINE

## 2023-07-31 PROCEDURE — 99233 PR SUBSEQUENT HOSPITAL CARE,LEVL III: ICD-10-PCS | Mod: GC,,, | Performed by: STUDENT IN AN ORGANIZED HEALTH CARE EDUCATION/TRAINING PROGRAM

## 2023-07-31 RX ORDER — OXYCODONE HYDROCHLORIDE 5 MG/1
5 TABLET ORAL EVERY 6 HOURS PRN
Status: DISCONTINUED | OUTPATIENT
Start: 2023-07-31 | End: 2023-08-03

## 2023-07-31 RX ADMIN — SODIUM CHLORIDE, POTASSIUM CHLORIDE, SODIUM LACTATE AND CALCIUM CHLORIDE: 600; 310; 30; 20 INJECTION, SOLUTION INTRAVENOUS at 08:07

## 2023-07-31 RX ADMIN — CHLORDIAZEPOXIDE HYDROCHLORIDE 25 MG: 25 CAPSULE ORAL at 05:07

## 2023-07-31 RX ADMIN — Medication 6 MG: at 10:07

## 2023-07-31 RX ADMIN — MORPHINE SULFATE 2 MG: 2 INJECTION, SOLUTION INTRAMUSCULAR; INTRAVENOUS at 06:07

## 2023-07-31 RX ADMIN — SODIUM CHLORIDE, POTASSIUM CHLORIDE, SODIUM LACTATE AND CALCIUM CHLORIDE: 600; 310; 30; 20 INJECTION, SOLUTION INTRAVENOUS at 10:07

## 2023-07-31 RX ADMIN — MUPIROCIN: 20 OINTMENT TOPICAL at 12:07

## 2023-07-31 RX ADMIN — MUPIROCIN: 20 OINTMENT TOPICAL at 10:07

## 2023-07-31 RX ADMIN — MORPHINE SULFATE 2 MG: 2 INJECTION, SOLUTION INTRAMUSCULAR; INTRAVENOUS at 03:07

## 2023-07-31 RX ADMIN — OXYCODONE HYDROCHLORIDE 5 MG: 5 TABLET ORAL at 10:07

## 2023-07-31 NOTE — ASSESSMENT & PLAN NOTE
07/31/2023  - Troponin has resolved   - No chest pain      Troponin at [facility] 94.0  Trend troponin   No chest pain    7/30  - repeat troponins trending down  - no chest pain   - echo showed normal right ventricle and aortic valve with no pericardial effusion  - telemetry showed normal sinus rhythm, con't monitoring

## 2023-07-31 NOTE — PROGRESS NOTES
[facility] - [address] [place] [address] Medicine  Progress Note    Patient Name: Raj Watkins  MRN: [id number]  Patient Class: IP- Inpatient   Admission Date: 7/29/2023  Length of Stay: 2 days  Attending Physician: Adan Preciado DO  Primary Care Provider: Primary Doctor Thu        Subjective:     Principal Problem:<principal problem not specified>        HPI:  Patient is a 38 year old male with a past medical history of diabetes mellitus, low back pain, and hypertension who is a direct admit of [facility]. Patient presents with bilateral hip pain following a fall. He states that last night he starting thing about an ongoing family issue and started drinking. He also endorsed using methamphetamines. Of note, patient has a history of IVDU specifically methamphetamine, as well as, alcohol use. He states he has been drinking about 7-9 beers per night. Last night, after drinking and using methamphetamines he got up to go to the restroom and fell into the tub. Denies any head trauma. Patient estimates he was unconscious throughout the night (approximately 4 + hours) in a hot bathroom. When he awoke his legs were crossed and hanging over the edge of the tub. Upon attempting to sit up on the edge of the tube he noted severe bilateral hip pain. EMS was alerted and he was transport to [facility] emergency department.     In the ED at [facility], his vital were /81, T 36.4 C. Labs were significant for WBC 62219, , CK > 1000, myoglobin > 1000. Patient was given 3L NS, magnesium sulfate 4g IV, and cath.     In the ambulance, patient put out 1700 ml of dark brown urine, was given Fentanyl 100, and Zofran 4mg. Patient was admitted directly to the hospialist service at [facility]           Overview/Hospital Course:  07/31/2023: Raj Knutson was seen and examined at bedside. Patient is A&O X2( name and year); He reports weakness, low appetite, and body  tenderness. Patient urine output today 1200cc. CIWA 4( mild nausea, mild anxious, and mild itching).  Patient denies chest pain, SOB, visual, auditory, or tactile hallucinations.       Interval History: Rja Knutson was seen and examined at bedside. Patient is A&O X2( name and year); He reports weakness, low appetite, and body tenderness. Patient urine output today 1200cc with a tea-color appearance. Today's CIWA 4( mild nausea, mild anxious, and mild itching) increased from 1. Patient denies chest pain, SOB, visual, auditory, or tactile hallucinations.     Review of Systems   Constitutional:  Positive for appetite change. Negative for activity change and fever.        Patient complains of poor appetite; only drinking  fluids   HENT:  Negative for postnasal drip, rhinorrhea and sinus pressure.    Respiratory:  Negative for cough, chest tightness and shortness of breath.    Cardiovascular:  Positive for leg swelling. Negative for chest pain and palpitations.   Gastrointestinal:  Negative for abdominal distention, abdominal pain, constipation and diarrhea.   Genitourinary:  Positive for difficulty urinating. Negative for enuresis and frequency.        [facility] urine outpatient is 1200mL   Musculoskeletal:  Positive for arthralgias, back pain, gait problem and myalgias.   Skin:  Negative for rash and wound.   Allergic/Immunologic: Negative for environmental allergies and food allergies.   Neurological:  Positive for weakness. Negative for speech difficulty, light-headedness and headaches.   Psychiatric/Behavioral:  Negative for agitation, behavioral problems and hallucinations. The patient is nervous/anxious. The patient is not hyperactive.    All other systems reviewed and are negative.    Objective:     Vital Signs (Most Recent):  Temp: 98.3 °F (36.8 °C) (07/31/23 0705)  Pulse: 71 (07/31/23 0705)  Resp: 18 (07/31/23 0705)  BP: (!) 149/83 (07/31/23 0705)  SpO2: 96 % (07/31/23 0705) Vital Signs (24h  Range):  Temp:  [96.3 °F (35.7 °C)-99.3 °F (37.4 °C)] 98.3 °F (36.8 °C)  Pulse:  [71-86] 71  Resp:  [16-19] 18  SpO2:  [96 %-98 %] 96 %  BP: (128-149)/(64-83) 149/83     Weight: 119.7 kg (263 lb 14.3 oz)  Body mass index is 32.98 kg/m².    Intake/Output Summary (Last 24 hours) at 7/31/2023 1042  Last data filed at 7/31/2023 0815  Gross per 24 hour   Intake 480 ml   Output 5900 ml   Net -5420 ml         Physical Exam  Constitutional:       Appearance: He is ill-appearing.   HENT:      Head: Normocephalic and atraumatic.   Eyes:      General: No scleral icterus.  Cardiovascular:      Pulses: Normal pulses.      Heart sounds: Normal heart sounds. No murmur heard.     No friction rub. No gallop.   Abdominal:      General: There is no distension.      Palpations: There is no mass.      Tenderness: There is no abdominal tenderness. There is no rebound.   Musculoskeletal:      Right lower leg: Edema present.      Left lower leg: Edema present.      Comments: Trace of bilateral edema    Skin:     Coloration: Skin is not jaundiced or pale.      Findings: No bruising, erythema, lesion or rash.   Neurological:      Motor: Weakness present.   Psychiatric:         Thought Content: Thought content normal.         Significant Labs: All pertinent labs within the past 24 hours have been reviewed.  BMP:   Recent Labs   Lab 07/31/23  0445   *   *   K 4.2   CL 93*   CO2 22   BUN 16   CREATININE 1.16   CALCIUM 8.1*     CBC:   Recent Labs   Lab 07/29/23 2159 07/30/23  0548 07/31/23  0445   WBC 21.83* 20.70* 13.65*   HGB 15.4 14.7 14.6   HCT 43.3 43.7 44.8    207 157     CMP:   Recent Labs   Lab 07/29/23 2159 07/30/23  0548 07/31/23  0445   * 126* 124*   K 4.3 4.1 4.2   CL 92* 92* 93*   CO2 21 26 22   * 119* 135*   BUN 24* 21* 16   CREATININE 1.56* 1.48* 1.16   CALCIUM 7.4* 7.4* 8.1*   PROT 6.4 5.8* 6.1*   ALBUMIN 2.9* 2.8* 2.0*   BILITOT 1.2 1.2 1.0   ALKPHOS 52 50 49   AST 1,842* 1,747* 1,121*   ALT  452* 486* 361*   ANIONGAP 15 12 13     Cardiac Markers:   Recent Labs   Lab 07/31/23  1019   MYOGLOBIN 5,787*       Significant Imaging: I have reviewed all pertinent imaging results/findings within the past 24 hours.  CT: I have reviewed all pertinent results/findings within the past 24 hours and my personal findings are:         Assessment/Plan:      Rhabdomyolysis  07/31/2023  - Repeated CPK and Myoglobin; 24,240 and 5,787 respectively; trending  in the right direction  -Continue IVF resuscitation;  mL/Hr  -Continue urine output      Transaminitis  07/31/2023  - AST 1,121;   -Repeat CMP tomorrow        At [facility] AST 2026,   Repeat CMP      Elevated troponin I level  07/31/2023  - Troponin has resolved   - No chest pain      Troponin at [facility] 94.0  Trend troponin   No chest pain    7/30  - repeat troponins trending down  - no chest pain   - echo showed normal right ventricle and aortic valve with no pericardial effusion  - telemetry showed normal sinus rhythm, con't monitoring    YANNICK (acute kidney injury)  07/31/2023  - YANNICK has resolved  -Cr 1.16  -BUN 16  - Continue to monitor hyponatremia of 124; will replenish as needed             Patient with acute kidney injury/acute renal failure likely due to pre-renal azotemia due to dehydration YANNICK is currently stable. Baseline creatinine unknown - Labs reviewed- Renal function/electrolytes with Estimated Creatinine Clearance: 119.2 mL/min (based on SCr of 1.16 mg/dL). according to latest data. Monitor urine output and serial BMP and adjust therapy as needed. Avoid nephrotoxins and renally dose meds for GFR listed above.    Creatinine 2.15 at [facility]     7/30  - likely secondary to rhabdomyolysis vs dehydration  - con't ivf  - cr stable     Bilateral hip pain  7/31/2023  -Patient reports hip tenderness and weakness  - Ordered PT for evaluate and treat   - Will continue to monitor       7/30  Patient reportedly fell at home in Bathtub for unknown  amount of time  Back and Hip pain along with rhabdomyolysis  CT Cervical, Thoracic and Pelvis without contrast - negative for any acute process.   Lumbar CT - Multilevel degenerative change of the lumbar spine.       Uncomplicated alcohol dependence  07/31/2023  -Discontinued chlordiazepoxide 25 q8hr for elevated LFTs and CIWA is a 4  - Liver enzymes are trending down AST 1,121;   CIWA score 4 points today; last CIWA was 1 point   -Continue Ativan PRN for withdrawals           Patient reportedly drinking >6 beers per day  Ativan PRN for withdrawal   CIWA score 1 point    7/30  - continue ativan prn for withdrawal  - start chlordiazepoxide 25 q8hr; coverage for withdrawal/anxiety        Primary hypertension  07/31/2023  - Blood pressure today 149/83; will continue to monitor           VTE Risk Mitigation (From admission, onward)         Ordered     IP VTE LOW RISK PATIENT  Once         07/29/23 1745     Place sequential compression device  Until discontinued         07/29/23 1745                Discharge Planning   DONNA:      Code Status: Full Code   Is the patient medically ready for discharge?:     Reason for patient still in hospital (select all that apply): Treatment  Discharge Plan A: Home with family                  Sal Carbajal MD  Department of Hospital Medicine   [facility] - [address] [place] [address]

## 2023-07-31 NOTE — PLAN OF CARE
Problem: Physical Therapy  Goal: Physical Therapy Goal  Description: Short Term Goals to be met by: 2023    Patient will increase functional independence with mobility by performin. Supine to sit with independently  2. Sit to stand transfer with independently using lowest level of assistive device  3. Bed to chair transfer with independently using lowest level of assistive device  4. Gait  x 100 feet with independently using lowest level of assistive device  5. Lower extremity exercise program x30 reps per handout, with assistance as needed    Long Term Goals to be met by: 2023    Pt will regain full independent functional mobility with lowest level of assistive device to return to home situation and prior activities of daily living.   Outcome: Ongoing, Progressing     Patient demonstrates significant R>L LE weakness and excruciating pain with attempted mobility. Patient not able to achieve full upright stance. Patient will require significant rehab to regain independent function. Patient with no insurance so d/c disposition will have to be home with family.

## 2023-07-31 NOTE — ASSESSMENT & PLAN NOTE
07/31/2023  - YANNICK has resolved  -Cr 1.16  -BUN 16  - Continue to monitor hyponatremia of 124; will replenish as needed             Patient with acute kidney injury/acute renal failure likely due to pre-renal azotemia due to dehydration YANNICK is currently stable. Baseline creatinine unknown - Labs reviewed- Renal function/electrolytes with Estimated Creatinine Clearance: 119.2 mL/min (based on SCr of 1.16 mg/dL). according to latest data. Monitor urine output and serial BMP and adjust therapy as needed. Avoid nephrotoxins and renally dose meds for GFR listed above.    Creatinine 2.15 at [facility]     7/30  - likely secondary to rhabdomyolysis vs dehydration  - con't ivf  - cr stable

## 2023-07-31 NOTE — PROGRESS NOTES
"[facility] - [address] [place] [address]  Adult Nutrition  First Assessment Note         Reason for Assessment  Reason For Assessment: identified at risk by screening criteria (MST3)   Nutrition Risk Screen: no indicators present    Assessment and Plan  Patient seen for MST3. He was admitted on 7/29 for rhabdomyolysis.     Patient is 263 pounds with a BMI of 32.98 and is obese. Visited with patient this morning, he endorsed a good appetite and denied any change in appetite, stated he eats "too well" at home.    He is currently on a Regular diet. Recommend continue current diet as able/appropriate and tolerated. Encourage good po intakes.    Last BM 7/27 per flowsheet. Recommend consider bowel regimen as constipation can affect diet tolerance.    Medications/labs reviewed. RD following.     Per MD:  "HPI: Patient is a 38 year old male with a past medical history of diabetes mellitus, low back pain, and hypertension who is a direct admit of [facility]. Patient presents with bilateral hip pain following a fall. He states that last night he starting thing about an ongoing family issue and started drinking. He also endorsed using methamphetamines. Of note, patient has a history of IVDU specifically methamphetamine, as well as, alcohol use. He states he has been drinking about 7-9 beers per night. Last night, after drinking and using methamphetamines he got up to go to the restroom and fell into the tub. Denies any head trauma. Patient estimates he was unconscious throughout the night (approximately 4 + hours) in a hot bathroom. When he awoke his legs were crossed and hanging over the edge of the tub. Upon attempting to sit up on the edge of the tube he noted severe bilateral hip pain. EMS was alerted and he was transport to [facility] emergency department.    In the ED at [facility], his vital were /81, T 36.4 C. Labs were significant for WBC 59901, , CK > 1000, myoglobin > " "1000. Patient was given 3L NS, magnesium sulfate 4g IV, and cath.    In the ambulance, patient put out 1700 ml of dark brown urine, was given Fentanyl 100, and Zofran 4mg. Patient was admitted directly to the hospialist service at [facility] "    Skin Integrity  Shayne Risk Assessment  Sensory Perception: 4-->no impairment  Moisture: 4-->rarely moist  Activity: 1-->bedfast  Mobility: 2-->very limited  Nutrition: 3-->adequate  Friction and Shear: 3-->no apparent problem  Shayne Score: 17        Malnutrition  Is patient malnourished? No      Nutrition Diagnosis    Overweight related to excessive po intakes as evidenced by BMI 32.98 (obese)    Interventions/Recommendations (treatment strategy):  Recommend continue current diet as able/appropriate and tolerated. Encourage good po intakes.    Nutrition Diagnosis Status:   New     Nutrition Risk  Level of Risk/Frequency of Follow-up: low    Recent Labs   Lab 07/31/23  0445   *     Comments on Glucose: Glucose elevated. No PMH  of DM. Possibly r/t rhabdomyolysis    Nutrition Prescription / Recommendations  Recommendation/Intervention: Recommend continue current diet as able/appropriate and tolerated. Encourage good po intakes.  Goals: Weight maintenance, intake % of meals  Nutrition Goal Status: new  Current Diet Order: Regular  Chewing or Swallowing Difficulty?: No Chewing or swallowing difficulty  Recommended Diet: Regular  Recommended Oral Supplement: No Oral Supplements  Is Nutrition Support Recommended: No   Is Education Recommended: No  Monitor and Evaluation  % current Intake: Unknown/ No P.O. intake documented  % intake to meet estimated needs: 75 - 100 %  Food and Nutrient Adminstration: diet order  Anthropometric Measurements: weight, weight change  Biochemical Data, Medical Tests and Procedures: electrolyte and renal panel, gastrointestinal profile, glucose/endocrine profile, inflammatory profile, lipid profile     Current Medical " "Diagnosis and Past Medical History  Diagnosis: renal disease  No past medical history on file.  Nutrition/Diet History     Lab/Procedures/Meds  Recent Labs   Lab 07/31/23  0445   *   K 4.2   BUN 16   CREATININE 1.16   CALCIUM 8.1*   ALBUMIN 2.0*   CL 93*   *   AST 1,121*   Note: Na+, Cl- low. Recommend consider replete to WNL as appropriate. Alb. Low. Ca++ low    Last A1c: No results found for: "HGBA1C"  Lab Results   Component Value Date    RBC 4.61 07/31/2023    HGB 14.6 07/31/2023    HCT 44.8 07/31/2023    MCV 97.2 (H) 07/31/2023    MCH 31.7 (H) 07/31/2023    MCHC 32.6 07/31/2023     Pertinent Labs Reviewed: reviewed  Pertinent Labs Comments: Sodium: 124 (L)  Potassium: 4.2  Chloride: 93 (L)  CO2: 22  Anion Gap: 13  BUN: 16  Creatinine: 1.16  BUN/CREAT RATIO: 14  eGFR: 82  Glucose: 135 (H)  Calcium: 8.1 (L)  Alkaline Phosphatase: 49  PROTEIN TOTAL: 6.1 (L)  Albumin: 2.0 (L)  Albumin/Globulin Ratio: 0.5  BILIRUBIN TOTAL: 1.0  AST: 1,121 (H)  ALT: 361 (H)  Globulin, Total: 4.1 (H)  Pertinent Medications Reviewed: reviewed  Anthropometrics  Temp: 98.5 °F (36.9 °C)  Height: 6' 3" (190.5 cm)  Height (inches): 75 in  Weight Method: Bed Scale  Weight: 119.7 kg (263 lb 14.3 oz)  Weight (lb): 263.89 lb  Ideal Body Weight (IBW), Male: 196 lb  % Ideal Body Weight, Male (lb): 134.69 %  BMI (Calculated): 33     Estimated/Assessed Needs  RMR ([place]-St. Jeor Equation): 2197.63     Temp: 98.5 °F (36.9 °C)Temporal  Weight Used For Calorie Calculations: 96.6 kg (213 lb)     Energy Calorie Requirements (kcal): 2415-2898kcal (25-30kcal/kg)  Weight Used For Protein Calculations: 96.6 kg (213 lb)  Protein Requirements: 97-116g (1.0-1.2g/kg)       RDA Method (mL): 2415     Nutrition by Nursing  Diet/Nutrition Received: regular           Last Bowel Movement: 07/26/23                Nutrition Follow-Up  RD Follow-up?: Yes      Jazmin Laguerre MS, RD, LD  Available through Secure Chat   "

## 2023-07-31 NOTE — SUBJECTIVE & OBJECTIVE
Interval History: Raj Knutson was seen and examined at bedside. Patient is A&O X2( name and year); He reports weakness, low appetite, and body tenderness. Patient urine output today 1200cc with a tea-color appearance. Today's CIWA 4( mild nausea, mild anxious, and mild itching) increased from 1. Patient denies chest pain, SOB, visual, auditory, or tactile hallucinations.     Review of Systems   Constitutional:  Positive for appetite change. Negative for activity change and fever.        Patient complains of poor appetite; only drinking  fluids   HENT:  Negative for postnasal drip, rhinorrhea and sinus pressure.    Respiratory:  Negative for cough, chest tightness and shortness of breath.    Cardiovascular:  Positive for leg swelling. Negative for chest pain and palpitations.   Gastrointestinal:  Negative for abdominal distention, abdominal pain, constipation and diarrhea.   Genitourinary:  Positive for difficulty urinating. Negative for enuresis and frequency.        [facility] urine outpatient is 1200mL   Musculoskeletal:  Positive for arthralgias, back pain, gait problem and myalgias.   Skin:  Negative for rash and wound.   Allergic/Immunologic: Negative for environmental allergies and food allergies.   Neurological:  Positive for weakness. Negative for speech difficulty, light-headedness and headaches.   Psychiatric/Behavioral:  Negative for agitation, behavioral problems and hallucinations. The patient is nervous/anxious. The patient is not hyperactive.    All other systems reviewed and are negative.    Objective:     Vital Signs (Most Recent):  Temp: 98.3 °F (36.8 °C) (07/31/23 0705)  Pulse: 71 (07/31/23 0705)  Resp: 18 (07/31/23 0705)  BP: (!) 149/83 (07/31/23 0705)  SpO2: 96 % (07/31/23 0705) Vital Signs (24h Range):  Temp:  [96.3 °F (35.7 °C)-99.3 °F (37.4 °C)] 98.3 °F (36.8 °C)  Pulse:  [71-86] 71  Resp:  [16-19] 18  SpO2:  [96 %-98 %] 96 %  BP: (128-149)/(64-83) 149/83     Weight: 119.7 kg (263 lb  14.3 oz)  Body mass index is 32.98 kg/m².    Intake/Output Summary (Last 24 hours) at 7/31/2023 1042  Last data filed at 7/31/2023 0815  Gross per 24 hour   Intake 480 ml   Output 5900 ml   Net -5420 ml         Physical Exam  Constitutional:       Appearance: He is ill-appearing.   HENT:      Head: Normocephalic and atraumatic.   Eyes:      General: No scleral icterus.  Cardiovascular:      Pulses: Normal pulses.      Heart sounds: Normal heart sounds. No murmur heard.     No friction rub. No gallop.   Abdominal:      General: There is no distension.      Palpations: There is no mass.      Tenderness: There is no abdominal tenderness. There is no rebound.   Musculoskeletal:      Right lower leg: Edema present.      Left lower leg: Edema present.      Comments: Trace of bilateral edema    Skin:     Coloration: Skin is not jaundiced or pale.      Findings: No bruising, erythema, lesion or rash.   Neurological:      Motor: Weakness present.   Psychiatric:         Thought Content: Thought content normal.         Significant Labs: All pertinent labs within the past 24 hours have been reviewed.  BMP:   Recent Labs   Lab 07/31/23  0445   *   *   K 4.2   CL 93*   CO2 22   BUN 16   CREATININE 1.16   CALCIUM 8.1*     CBC:   Recent Labs   Lab 07/29/23 2159 07/30/23  0548 07/31/23  0445   WBC 21.83* 20.70* 13.65*   HGB 15.4 14.7 14.6   HCT 43.3 43.7 44.8    207 157     CMP:   Recent Labs   Lab 07/29/23 2159 07/30/23  0548 07/31/23  0445   * 126* 124*   K 4.3 4.1 4.2   CL 92* 92* 93*   CO2 21 26 22   * 119* 135*   BUN 24* 21* 16   CREATININE 1.56* 1.48* 1.16   CALCIUM 7.4* 7.4* 8.1*   PROT 6.4 5.8* 6.1*   ALBUMIN 2.9* 2.8* 2.0*   BILITOT 1.2 1.2 1.0   ALKPHOS 52 50 49   AST 1,842* 1,747* 1,121*   * 486* 361*   ANIONGAP 15 12 13     Cardiac Markers:   Recent Labs   Lab 07/31/23  1019   MYOGLOBIN 5,787*       Significant Imaging: I have reviewed all pertinent imaging results/findings  within the past 24 hours.  CT: I have reviewed all pertinent results/findings within the past 24 hours and my personal findings are:

## 2023-07-31 NOTE — PT/OT/SLP EVAL
"Physical Therapy Evaluation and Treatment    Patient Name: Raj Watkins   MRN: [id number]  Recent Surgery: * No surgery found *      Recommendations:     Discharge Recommendations: home   Discharge Equipment Recommendations: other (see comments) (to be determined)   Barriers to discharge: Increased level of assist, Decreased caregiver support, and Ongoing medical treatment    Assessment:     Raj Watkins is a 38 y.o. male admitted with a medical diagnosis of YANNICK, rhabdomyolysis; fall into bathtub with overnight stay. He presents with the following impairments/functional limitations: weakness, impaired endurance, impaired self care skills, impaired functional mobility, impaired sensation, gait instability, impaired balance, decreased upper extremity function, decreased lower extremity function, decreased safety awareness, pain.     Patient demonstrates significant R>L LE weakness and excruciating pain with attempted mobility. Patient not able to achieve full upright stance. Patient will require significant rehab to regain independent function. Patient with no insurance so d/c disposition will have to be home with family.    Rehab Prognosis: Good; patient would benefit from acute PT services to address these deficits and reach maximum level of function.    Plan:     During this hospitalization, patient to be seen 5 x/week to address the above listed problems via gait training, therapeutic activities, therapeutic exercises    Plan of Care Expires: 08/31/23    Subjective     Chief Complaint: severe back pain and leg weakness post fall into bathtub with overnight stay/legs over the side of the tub  Patient Comments/Goals: "This is the worst thing I have ever been through. 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 Patient found supine with peripheral IV, william catheter upon PT entry to room.    General Precautions: Standard, fall   Orthopedic Precautions: N/A   Braces: N/A    Respiratory Status: Room air    Exams:  Cognition: Patient is oriented to Person, Place, Time, Situation and high anxiety  RLE ROM: Deficits: hip flexion to 90 in sitting; knee/ankle WFL  RLE Strength: Deficits: hip 2/5; knee 2/5; ankle DF 0/5; PF 1/5  LLE ROM: WFL  LLE Strength: Deficits: hip 3/5; knee 3/5; ankle 2/5  Sensation:    -       Impaired  Patient reports numbness/tingling in bilateral feet/legs but proprioception intact  Skin Integrity/Edema:     -       Edema: Moderate L UE/shoulder; mild left 2nd MCP with redness    Functional Mobility:  Gait belt applied - Yes  Bed Mobility  Rolling Left: maximal assistance and of 2 persons  Rolling Right: maximal assistance and of 2 persons  Supine to Sit: maximal assistance for LE management, trunk management, and extreme vocalization of pain; heavy cues for patient to not stop partway through transfer  Sit to Supine: maximal assistance and of 2 persons for LE management and trunk management  Transfers  Sit to Stand: maximal assistance and of 2 persons with rolling walker and with cues for hand placement, foot placement, and attempts to achieve full axial extension unsuccessful; extreme vocalization of pain  Gait  Patient unable to take steps or  transfer to [facility]  Balance  Sitting: stand by assistance  Standing: maximal assistance, of 2 persons, and RW- pt unable to achieve full axial extension      Therapeutic Activities and Exercises:   Patient educated on role of acute care PT and PT POC, safety while in hospital including calling nurse for mobility, and call light usage  Importance of practicing transfer despite pain to improve ease of mobility and return to baseline    AM-PAC 6 CLICK MOBILITY  Total Score:9    Patient left supine with all lines intact, call button in reach, RN notified, and bed alarm on.    GOALS:   Multidisciplinary Problems       Physical Therapy Goals          Problem: Physical Therapy    Goal Priority Disciplines Outcome Goal Variances Interventions   Physical Therapy Goal     PT, PT/OT Ongoing, Progressing     Description: Short Term Goals to be met by: 2023    Patient will increase functional independence with mobility by performin. Supine to sit with independently  2. Sit to stand transfer with independently using lowest level of assistive device  3. Bed to chair transfer with independently using lowest level of assistive device  4. Gait  x 100 feet with independently using lowest level of assistive device  5. Lower extremity exercise program x30 reps per handout, with assistance as needed    Long Term Goals to be met by: 2023    Pt will regain full independent functional mobility with lowest level of assistive device to return to home situation and prior activities of daily living.                        History:   No past medical history on file.    No past surgical history on file.    Time Tracking:     PT Received On: 23  PT Start Time: 1430  PT Stop Time: 1500  PT Total Time (min): 30 min     Billable Minutes: Evaluation Low complexity    2023

## 2023-07-31 NOTE — ASSESSMENT & PLAN NOTE
7/31/2023  -Patient reports hip tenderness and weakness  - Ordered PT for evaluate and treat   - Will continue to monitor       7/30  Patient reportedly fell at home in Bathtub for unknown amount of time  Back and Hip pain along with rhabdomyolysis  CT Cervical, Thoracic and Pelvis without contrast - negative for any acute process.   Lumbar CT - Multilevel degenerative change of the lumbar spine.

## 2023-07-31 NOTE — ASSESSMENT & PLAN NOTE
- Repeated CPK down trend 24,249 from 66,625; Myoglobin 5,787 from 23,239; continue to monitor  -Continue IVF resuscitation;  mL/hr; will continue to monitor urine output

## 2023-07-31 NOTE — ASSESSMENT & PLAN NOTE
07/31/2023  -Discontinued chlordiazepoxide 25 q8hr for elevated LFTs and CIWA is a 4  - Liver enzymes are trending down AST 1,121;   CIWA score 4 points today; last CIWA was 1 point   -Continue Ativan PRN for withdrawals           Patient reportedly drinking >6 beers per day  Ativan PRN for withdrawal   CIWA score 1 point    7/30  - continue ativan prn for withdrawal  - start chlordiazepoxide 25 q8hr; coverage for withdrawal/anxiety

## 2023-07-31 NOTE — PLAN OF CARE
[facility] - [facility]  Initial Discharge Assessment       Primary Care Provider: Primary Doctor No    Admission Diagnosis: Rhabdomyolysis [M62.82]    Admission Date: 7/29/2023  Expected Discharge Date:     Transition of Care Barriers: Unisured    Payor: /     No emergency contact information on file.    Discharge Plan A: Home with family  Discharge Plan B: Home with family      [facility] [place] - [address]  [address] [facility] [address]  Phone: [phone] Fax: [phone]      Initial Assessment (most recent)       Adult Discharge Assessment - 07/31/23 1343          Discharge Assessment    Assessment Type Discharge Planning Assessment     Source of Information patient     People in Home parent(s)     Do you expect to return to your current living situation? Yes     Do you have help at home or someone to help you manage your care at home? No     Prior to hospitilization cognitive status: Alert/Oriented     Current cognitive status: Alert/Oriented     Home Accessibility stairs to enter home     Number of Stairs, Main Entrance two     Equipment Currently Used at Home none     Patient currently being followed by outpatient case management? No     Do you currently have service(s) that help you manage your care at home? No     Do you take prescription medications? Yes     Do you have prescription coverage? Yes     Do you have any problems affording any of your prescribed medications? No     How do you get to doctors appointments? car, drives self     Are you on dialysis? No     Do you take coumadin? No     Discharge Plan A Home with family     Discharge Plan B Home with family     DME Needed Upon Discharge  none     Discharge Plan discussed with: Patient     Transition of Care Barriers Unisured        Physical Activity    On average, how many days per week do you engage in moderate to strenuous exercise (like a brisk walk)? 5 days     On average, how many  minutes do you engage in exercise at this level? 30 min        Financial Resource Strain    How hard is it for you to pay for the very basics like food, housing, medical care, and heating? Not hard at all        Housing Stability    In the last 12 months, was there a time when you were not able to pay the mortgage or rent on time? No     In the last 12 months, how many places have you lived? 1     In the last 12 months, was there a time when you did not have a steady place to sleep or slept in a shelter (including now)? No        Transportation Needs    In the past 12 months, has lack of transportation kept you from medical appointments or from getting medications? No     In the past 12 months, has lack of transportation kept you from meetings, work, or from getting things needed for daily living? No        Food Insecurity    Within the past 12 months, you worried that your food would run out before you got the money to buy more. Never true     Within the past 12 months, the food you bought just didn't last and you didn't have money to get more. Never true        Stress    Do you feel stress - tense, restless, nervous, or anxious, or unable to sleep at night because your mind is troubled all the time - these days? Not at all        Social Connections    In a typical week, how many times do you talk on the phone with family, friends, or neighbors? More than three times a week     How often do you get together with friends or relatives? More than three times a week     How often do you attend Rastafari or Mandaen services? More than 4 times per year     Do you belong to any clubs or organizations such as Rastafari groups, unions, fraternal or athletic groups, or school groups? No     How often do you attend meetings of the clubs or organizations you belong to? More than 4 times per year     Are you , , , , never , or living with a partner?         Alcohol Use    Q1: How often  do you have a drink containing alcohol? 4 or more times a week     Q2: How many drinks containing alcohol do you have on a typical day when you are drinking? 5 or 6     Q3: How often do you have six or more drinks on one occasion? Daily or almost daily                 Pt lives home with mother, no hh or dme pta, pt without insurance but started a new job recently and is waiting on insurance to start in about 40 days, aware of comm resources, left msg with andrey in business office to ask if pt qualifies for anything, [place] plan home

## 2023-07-31 NOTE — HOSPITAL COURSE
Patient is a 38 year old male with a past medical history of diabetes mellitus, low back pain, and hypertension who is a direct admit of [facility]. Patient presents with bilateral hip pain following a fall. He states that last night he starting thing about an ongoing family issue and started drinking. He also endorsed using methamphetamines. Of note, patient has a history of IVDU specifically methamphetamine, as well as, alcohol use.   In the ED at [facility], his vital were /81, T 36.4 C. Labs were significant for WBC 62925, , CK > 1000, myoglobin > 1000. Patient was given 3L NS, magnesium sulfate 4g IV, and cath. Patient complications has resolved enough to be discharged. Patient will follow-up with Dr. Chandler(urologist) outpatient in a week to remove william cathter and further work-up for urinary retention.       08/03/2023  Patient was seen and examined at bedside. No major concerns today. Vital unremarkable. LFT's are trending in the right direction. Urine output of 400 cc this morning. Denies angina, dyspnea, headache or abdominal discomfort    08/02/2023  Patient was seen and examined at bedside. No acute events overnight. Patients reports bilateral hip tenderness and groin tingling. Noted on physical examine right-side hip blisters with  tenderness with pressure. Right leg swelling; ultrasound doppler for DVT was negative. Urine output 200cc with william. Vitals are unremarkable. Leukocytosis has resolved 10.56; sodium 132; ;. Denies angina , dyspnea, headache, or abdominal discomfort.      07/31/2023: Raj Knutson was seen and examined at bedside. Patient is A&O X2( name and year); He reports weakness, low appetite, and body tenderness. Patient urine output today 1200cc. CIWA 4( mild nausea, mild anxious, and mild itching).  Patient denies chest pain, SOB, visual, auditory, or tactile hallucinations.

## 2023-07-31 NOTE — ASSESSMENT & PLAN NOTE
07/31/2023  - Repeated CPK and Myoglobin; 24,240 and 5,787 respectively; trending  in the right direction  -Continue IVF resuscitation;  mL/Hr  -Continue urine output

## 2023-08-01 LAB
ALBUMIN SERPL BCP-MCNC: 2 G/DL (ref 3.5–5)
ALBUMIN/GLOB SERPL: 0.5 {RATIO}
ALP SERPL-CCNC: 51 U/L (ref 45–115)
ALT SERPL W P-5'-P-CCNC: 357 U/L (ref 16–61)
ANION GAP SERPL CALCULATED.3IONS-SCNC: 9 MMOL/L (ref 7–16)
AST SERPL W P-5'-P-CCNC: 772 U/L (ref 15–37)
BASOPHILS # BLD AUTO: 0.03 K/UL (ref 0–0.2)
BASOPHILS NFR BLD AUTO: 0.3 % (ref 0–1)
BILIRUB SERPL-MCNC: 0.9 MG/DL (ref ?–1.2)
BUN SERPL-MCNC: 15 MG/DL (ref 7–18)
BUN/CREAT SERPL: 16 (ref 6–20)
CALCIUM SERPL-MCNC: 8.1 MG/DL (ref 8.5–10.1)
CHLORIDE SERPL-SCNC: 98 MMOL/L (ref 98–107)
CO2 SERPL-SCNC: 28 MMOL/L (ref 21–32)
CREAT SERPL-MCNC: 0.92 MG/DL (ref 0.7–1.3)
DIFFERENTIAL METHOD BLD: ABNORMAL
EGFR (NO RACE VARIABLE) (RUSH/TITUS): 109 ML/MIN/1.73M2
EOSINOPHIL # BLD AUTO: 0.04 K/UL (ref 0–0.5)
EOSINOPHIL NFR BLD AUTO: 0.3 % (ref 1–4)
ERYTHROCYTE [DISTWIDTH] IN BLOOD BY AUTOMATED COUNT: 12.5 % (ref 11.5–14.5)
GLOBULIN SER-MCNC: 3.9 G/DL (ref 2–4)
GLUCOSE SERPL-MCNC: 114 MG/DL (ref 74–106)
HCT VFR BLD AUTO: 38.3 % (ref 40–54)
HGB BLD-MCNC: 13.2 G/DL (ref 13.5–18)
IMM GRANULOCYTES # BLD AUTO: 0.14 K/UL (ref 0–0.04)
IMM GRANULOCYTES NFR BLD: 1.2 % (ref 0–0.4)
LYMPHOCYTES # BLD AUTO: 1.36 K/UL (ref 1–4.8)
LYMPHOCYTES NFR BLD AUTO: 11.4 % (ref 27–41)
MCH RBC QN AUTO: 31.4 PG (ref 27–31)
MCHC RBC AUTO-ENTMCNC: 34.5 G/DL (ref 32–36)
MCV RBC AUTO: 91 FL (ref 80–96)
MONOCYTES # BLD AUTO: 1.23 K/UL (ref 0–0.8)
MONOCYTES NFR BLD AUTO: 10.4 % (ref 2–6)
MPC BLD CALC-MCNC: 9.5 FL (ref 9.4–12.4)
NEUTROPHILS # BLD AUTO: 9.08 K/UL (ref 1.8–7.7)
NEUTROPHILS NFR BLD AUTO: 76.4 % (ref 53–65)
NRBC # BLD AUTO: 0 X10E3/UL
NRBC, AUTO (.00): 0 %
PLATELET # BLD AUTO: 196 K/UL (ref 150–400)
POTASSIUM SERPL-SCNC: 3.9 MMOL/L (ref 3.5–5.1)
PROT SERPL-MCNC: 5.9 G/DL (ref 6.4–8.2)
RBC # BLD AUTO: 4.21 M/UL (ref 4.6–6.2)
SODIUM SERPL-SCNC: 131 MMOL/L (ref 136–145)
WBC # BLD AUTO: 11.88 K/UL (ref 4.5–11)

## 2023-08-01 PROCEDURE — 63600175 PHARM REV CODE 636 W HCPCS

## 2023-08-01 PROCEDURE — 51702 INSERT TEMP BLADDER CATH: CPT

## 2023-08-01 PROCEDURE — 63600175 PHARM REV CODE 636 W HCPCS: Performed by: FAMILY MEDICINE

## 2023-08-01 PROCEDURE — 97530 THERAPEUTIC ACTIVITIES: CPT

## 2023-08-01 PROCEDURE — 80053 COMPREHEN METABOLIC PANEL: CPT

## 2023-08-01 PROCEDURE — 25000003 PHARM REV CODE 250

## 2023-08-01 PROCEDURE — 63600175 PHARM REV CODE 636 W HCPCS: Performed by: STUDENT IN AN ORGANIZED HEALTH CARE EDUCATION/TRAINING PROGRAM

## 2023-08-01 PROCEDURE — 25000003 PHARM REV CODE 250: Performed by: FAMILY MEDICINE

## 2023-08-01 PROCEDURE — 11000001 HC ACUTE MED/SURG PRIVATE ROOM

## 2023-08-01 PROCEDURE — 99232 PR SUBSEQUENT HOSPITAL CARE,LEVL II: ICD-10-PCS | Mod: ,,, | Performed by: STUDENT IN AN ORGANIZED HEALTH CARE EDUCATION/TRAINING PROGRAM

## 2023-08-01 PROCEDURE — 85025 COMPLETE CBC W/AUTO DIFF WBC: CPT

## 2023-08-01 PROCEDURE — 25000003 PHARM REV CODE 250: Performed by: STUDENT IN AN ORGANIZED HEALTH CARE EDUCATION/TRAINING PROGRAM

## 2023-08-01 PROCEDURE — 99232 SBSQ HOSP IP/OBS MODERATE 35: CPT | Mod: ,,, | Performed by: STUDENT IN AN ORGANIZED HEALTH CARE EDUCATION/TRAINING PROGRAM

## 2023-08-01 RX ORDER — TAMSULOSIN HYDROCHLORIDE 0.4 MG/1
0.4 CAPSULE ORAL DAILY
Status: DISCONTINUED | OUTPATIENT
Start: 2023-08-01 | End: 2023-08-03 | Stop reason: HOSPADM

## 2023-08-01 RX ORDER — TAMSULOSIN HYDROCHLORIDE 0.4 MG/1
0.8 CAPSULE ORAL DAILY
Status: DISCONTINUED | OUTPATIENT
Start: 2023-08-01 | End: 2023-08-01

## 2023-08-01 RX ORDER — ENOXAPARIN SODIUM 100 MG/ML
40 INJECTION SUBCUTANEOUS EVERY 24 HOURS
Status: DISCONTINUED | OUTPATIENT
Start: 2023-08-01 | End: 2023-08-03 | Stop reason: HOSPADM

## 2023-08-01 RX ORDER — LORAZEPAM 1 MG/1
1 TABLET ORAL EVERY 6 HOURS PRN
Status: DISCONTINUED | OUTPATIENT
Start: 2023-08-01 | End: 2023-08-03 | Stop reason: HOSPADM

## 2023-08-01 RX ADMIN — MORPHINE SULFATE 2 MG: 2 INJECTION, SOLUTION INTRAMUSCULAR; INTRAVENOUS at 10:08

## 2023-08-01 RX ADMIN — OXYCODONE HYDROCHLORIDE 5 MG: 5 TABLET ORAL at 05:08

## 2023-08-01 RX ADMIN — LORAZEPAM 1 MG: 1 TABLET ORAL at 08:08

## 2023-08-01 RX ADMIN — SODIUM CHLORIDE, POTASSIUM CHLORIDE, SODIUM LACTATE AND CALCIUM CHLORIDE: 600; 310; 30; 20 INJECTION, SOLUTION INTRAVENOUS at 08:08

## 2023-08-01 RX ADMIN — MUPIROCIN: 20 OINTMENT TOPICAL at 09:08

## 2023-08-01 RX ADMIN — OXYCODONE HYDROCHLORIDE 5 MG: 5 TABLET ORAL at 01:08

## 2023-08-01 RX ADMIN — Medication 6 MG: at 08:08

## 2023-08-01 RX ADMIN — SODIUM CHLORIDE, POTASSIUM CHLORIDE, SODIUM LACTATE AND CALCIUM CHLORIDE: 600; 310; 30; 20 INJECTION, SOLUTION INTRAVENOUS at 05:08

## 2023-08-01 RX ADMIN — OXYCODONE HYDROCHLORIDE 5 MG: 5 TABLET ORAL at 08:08

## 2023-08-01 RX ADMIN — OXYCODONE HYDROCHLORIDE 5 MG: 5 TABLET ORAL at 09:08

## 2023-08-01 RX ADMIN — MORPHINE SULFATE 2 MG: 2 INJECTION, SOLUTION INTRAMUSCULAR; INTRAVENOUS at 03:08

## 2023-08-01 RX ADMIN — ENOXAPARIN SODIUM 40 MG: 100 INJECTION SUBCUTANEOUS at 05:08

## 2023-08-01 RX ADMIN — MORPHINE SULFATE 2 MG: 2 INJECTION, SOLUTION INTRAMUSCULAR; INTRAVENOUS at 05:08

## 2023-08-01 RX ADMIN — MUPIROCIN: 20 OINTMENT TOPICAL at 08:08

## 2023-08-01 RX ADMIN — TAMSULOSIN HYDROCHLORIDE 0.4 MG: 0.4 CAPSULE ORAL at 01:08

## 2023-08-01 RX ADMIN — LORAZEPAM 1 MG: 1 TABLET ORAL at 01:08

## 2023-08-01 NOTE — ASSESSMENT & PLAN NOTE
08/01/2023  -Blood pressure 132/80  - Continue to monitor      07/31/2023  - Blood pressure today 149/83; will continue to monitor

## 2023-08-01 NOTE — NURSING
Informed pt of order to remove william, pt states that he does not want william removed at present time and wants to wait until the morning, Dr. Rodriguez notified, states ok to leave it in for now and dc in am

## 2023-08-01 NOTE — PROGRESS NOTES
[facility] - [address] [place] [address] Medicine  Progress Note    Patient Name: Raj Watkins  MRN: [id number]  Patient Class: IP- Inpatient   Admission Date: 7/29/2023  Length of Stay: 3 days  Attending Physician: Adan Preciado DO  Primary Care Provider: Primary Doctor Thu        Subjective:     Principal Problem:<principal problem not specified>        HPI:  Patient is a 38 year old male with a past medical history of diabetes mellitus, low back pain, and hypertension who is a direct admit of [facility]. Patient presents with bilateral hip pain following a fall. He states that last night he starting thing about an ongoing family issue and started drinking. He also endorsed using methamphetamines. Of note, patient has a history of IVDU specifically methamphetamine, as well as, alcohol use. He states he has been drinking about 7-9 beers per night. Last night, after drinking and using methamphetamines he got up to go to the restroom and fell into the tub. Denies any head trauma. Patient estimates he was unconscious throughout the night (approximately 4 + hours) in a hot bathroom. When he awoke his legs were crossed and hanging over the edge of the tub. Upon attempting to sit up on the edge of the tube he noted severe bilateral hip pain. EMS was alerted and he was transport to [facility] emergency department.     In the ED at [facility], his vital were /81, T 36.4 C. Labs were significant for WBC 59403, , CK > 1000, myoglobin > 1000. Patient was given 3L NS, magnesium sulfate 4g IV, and cath.     In the ambulance, patient put out 1700 ml of dark brown urine, was given Fentanyl 100, and Zofran 4mg. Patient was admitted directly to the hospialist service at [facility]           Overview/Hospital Course:  07/31/2023: Raj Knutson was seen and examined at bedside. Patient is A&O X2( name and year); He reports weakness, low appetite, and body  tenderness. Patient urine output today 1200cc. CIWA 4( mild nausea, mild anxious, and mild itching).  Patient denies chest pain, SOB, visual, auditory, or tactile hallucinations.       Interval History:   Raj Watkins was seen and examined at bedside. No acute events overnight. He reports good appetite. Patient was seen by PT for poor mobility; recommendation is outpatient therapy. Patient william catheter removed this morning; no urine output in 6 hours. Ordered a bedside bladder scan. Vitals unremarkable; WBC and sodium are trending in the right direction. ; JODIE 357; Lumbar MRI degenerative disc disease. CIWA score 1. On physical examine noted right lower extremity swelling. Ordered b/l ultrasound doppler to rule out DVT; started Lovenox prophylaxis 40 mg once/day. Denies chest pain, shortness of breath, headache, dizziness, or abdominal pain.        Review of Systems   Constitutional:  Positive for fatigue.   Respiratory:  Negative for chest tightness and shortness of breath.    Cardiovascular:  Negative for chest pain.   Gastrointestinal:  Negative for abdominal pain and constipation.   Genitourinary:  Positive for difficulty urinating.        William catheter removed this morning; no urine output.    Skin:  Negative for color change.   Neurological:  Negative for headaches.   Psychiatric/Behavioral:  Negative for agitation.      Objective:     Vital Signs (Most Recent):  Temp: 98.8 °F (37.1 °C) (08/01/23 1030)  Pulse: 71 (08/01/23 1030)  Resp: 18 (08/01/23 1030)  BP: 133/71 (08/01/23 1030)  SpO2: 95 % (08/01/23 1030) Vital Signs (24h Range):  Temp:  [97.8 °F (36.6 °C)-98.8 °F (37.1 °C)] 98.8 °F (37.1 °C)  Pulse:  [71-80] 71  Resp:  [16-18] 18  SpO2:  [95 %-97 %] 95 %  BP: (132-142)/(60-80) 133/71     Weight: 119.7 kg (263 lb 14.3 oz)  Body mass index is 32.98 kg/m².    Intake/Output Summary (Last 24 hours) at 8/1/2023 1209  Last data filed at 8/1/2023 0610  Gross per 24 hour   Intake 600 ml   Output 3200  ml   Net -2600 ml         Physical Exam  Constitutional:       General: He is not in acute distress.     Appearance: He is not ill-appearing.   HENT:      Head: Normocephalic and atraumatic.   Eyes:      Extraocular Movements: Extraocular movements intact.   Cardiovascular:      Rate and Rhythm: Normal rate.      Pulses: Normal pulses.      Heart sounds: Normal heart sounds. No murmur heard.     No friction rub. No gallop.   Pulmonary:      Effort: Pulmonary effort is normal. No respiratory distress.      Breath sounds: Normal breath sounds. No stridor. No wheezing or rhonchi.   Abdominal:      General: There is no distension.      Palpations: There is no mass.      Tenderness: There is no abdominal tenderness.   Musculoskeletal:         General: Swelling present. No tenderness.      Cervical back: Normal range of motion and neck supple.      Right lower leg: No edema.      Left lower leg: No edema.      Comments: Right lower extremity swelling   Skin:     Capillary Refill: Capillary refill takes less than 2 seconds.      Coloration: Skin is not jaundiced or pale.   Neurological:      Mental Status: He is alert and oriented to person, place, and time.           Significant Labs: All pertinent labs within the past 24 hours have been reviewed.  Recent Lab Results         08/01/23  0322        Albumin/Globulin Ratio 0.5       Albumin 2.0       Alkaline Phosphatase 51              Anion Gap 9              Baso # 0.03       Basophil % 0.3       BILIRUBIN TOTAL 0.9       BUN 15       BUN/CREAT RATIO 16       Calcium 8.1       Chloride 98       CO2 28       Creatinine 0.92       Differential Type Auto       eGFR 109       Eos # 0.04       Eosinophil % 0.3       Globulin, Total 3.9       Glucose 114       Hematocrit 38.3       Hemoglobin 13.2       Immature Grans (Abs) 0.14       Immature Granulocytes 1.2       Lymph # 1.36       Lymph % 11.4       MCH 31.4       MCHC 34.5       MCV 91.0       Mono # 1.23        Mono % 10.4       MPV 9.5       Neutrophils, Abs 9.08       Neutrophils Relative 76.4       nRBC 0.0       NUCLEATED RBC ABSOLUTE 0.00       Platelets 196       Potassium 3.9       PROTEIN TOTAL 5.9       RBC 4.21       RDW 12.5       Sodium 131       WBC 11.88               Significant Imaging: I have reviewed all pertinent imaging results/findings within the past 24 hours.      Assessment/Plan:      Rhabdomyolysis  08/01/2023  - Rhabdomyolysis is improving  -Decreased urine output; ordered bedside bladder scan; results 509 mL        07/31/2023  - Repeated CPK and Myoglobin; 24,240 and 5,787 respectively; trending  in the right direction  -Continue IVF resuscitation;  mL/Hr  -Continue urine output      Transaminitis  08/01/2023  - ;     07/31/2023  - AST 1,121;   -Repeat CMP tomorrow        At [facility] AST 2026,   Repeat CMP      Elevated troponin I level  07/31/2023  - Troponin has resolved   - No chest pain      Troponin at [facility] 94.0  Trend troponin   No chest pain    7/30  - repeat troponins trending down  - no chest pain   - echo showed normal right ventricle and aortic valve with no pericardial effusion  - telemetry showed normal sinus rhythm, con't monitoring    YANNICK (acute kidney injury)  08/01/2023  - Hyponatremia has improved; Sodium 131  -Will continue to monitor        07/31/2023  - YANNICK has resolved  -Cr 1.16  -BUN 16  - Continue to monitor hyponatremia of 124; will replenish as needed             Patient with acute kidney injury/acute renal failure likely due to pre-renal azotemia due to dehydration YANNICK is currently stable. Baseline creatinine unknown - Labs reviewed- Renal function/electrolytes with Estimated Creatinine Clearance: 150.3 mL/min (based on SCr of 0.92 mg/dL). according to latest data. Monitor urine output and serial BMP and adjust therapy as needed. Avoid nephrotoxins and renally dose meds for GFR listed above.    Creatinine 2.15 at [facility]      7/30  - likely secondary to rhabdomyolysis vs dehydration  - con't ivf  - cr stable     Bilateral hip pain  08/01/2023  -Seen by PT; recommended rehabilitation to regain independent function; We appreciate your recommendations  -Lumbar MRI showed degenerative disc disease and L3-4 stenosis  will continue to treat chronic pain           7/31/2023  -Patient reports hip tenderness and weakness  - Ordered PT for evaluate and treat   - Will continue to monitor       7/30  Patient reportedly fell at home in Bathtub for unknown amount of time  Back and Hip pain along with rhabdomyolysis  CT Cervical, Thoracic and Pelvis without contrast - negative for any acute process.   Lumbar CT - Multilevel degenerative change of the lumbar spine.       Uncomplicated alcohol dependence  08/01/2023  - Liver enzymes trending in the right direction  -CIWA score 1  - Continue Ativan 2mg PRN for withdrawal precaution        07/31/2023  -Discontinued chlordiazepoxide 25 q8hr for elevated LFTs and CIWA is a 4  - Liver enzymes are trending down AST 1,121;   CIWA score 4 points today; last CIWA was 1 point   -Continue Ativan PRN for withdrawals           Patient reportedly drinking >6 beers per day  Ativan PRN for withdrawal   CIWA score 1 point    7/30  - continue ativan prn for withdrawal  - start chlordiazepoxide 25 q8hr; coverage for withdrawal/anxiety        Primary hypertension  08/01/2023  -Blood pressure 132/80  - Continue to monitor      07/31/2023  - Blood pressure today 149/83; will continue to monitor           VTE Risk Mitigation (From admission, onward)           Ordered     enoxaparin injection 40 mg  Every 24 hours         08/01/23 1114     IP VTE LOW RISK PATIENT  Once         07/29/23 1745     Place sequential compression device  Until discontinued         07/29/23 1745                    Discharge Planning   DONNA:      Code Status: Full Code   Is the patient medically ready for discharge?:     Reason for patient  still in hospital (select all that apply): Treatment  Discharge Plan A: Home with family                  Sal Carbajal MD  Department of Hospital Medicine   [facility] - [address] [place] [address]

## 2023-08-01 NOTE — ASSESSMENT & PLAN NOTE
08/01/2023  -Seen by PT; recommended rehabilitation to regain independent function; We appreciate your recommendations  -Lumbar MRI showed degenerative disc disease and L3-4 stenosis  will continue to treat chronic pain           7/31/2023  -Patient reports hip tenderness and weakness  - Ordered PT for evaluate and treat   - Will continue to monitor       7/30  Patient reportedly fell at home in Bathtub for unknown amount of time  Back and Hip pain along with rhabdomyolysis  CT Cervical, Thoracic and Pelvis without contrast - negative for any acute process.   Lumbar CT - Multilevel degenerative change of the lumbar spine.      Vascular  S/P right anterior compartment fasciotomy  Leg \"feels better\"  Now has some dorsiflexion  Starting heparin for afib  following

## 2023-08-01 NOTE — ASSESSMENT & PLAN NOTE
08/01/2023  - ;     07/31/2023  - AST 1,121;   -Repeat CMP tomorrow        At [facility] AST 2026,   Repeat CMP

## 2023-08-01 NOTE — SUBJECTIVE & OBJECTIVE
Interval History:   Raj Watkins was seen and examined at bedside. No acute events overnight. He reports good appetite. Patient was seen by PT for poor mobility; recommendation is outpatient therapy. Patient william catheter removed this morning; no urine output in 6 hours. Ordered a bedside bladder scan. Vitals unremarkable; WBC and sodium are trending in the right direction. ; JODIE 357; Lumbar MRI degenerative disc disease. CIWA score 1. On physical examine noted right lower extremity swelling. Ordered b/l ultrasound doppler to rule out DVT; started Lovenox prophylaxis 40 mg once/day. Denies chest pain, shortness of breath, headache, dizziness, or abdominal pain.        Review of Systems   Constitutional:  Positive for fatigue.   Respiratory:  Negative for chest tightness and shortness of breath.    Cardiovascular:  Negative for chest pain.   Gastrointestinal:  Negative for abdominal pain and constipation.   Genitourinary:  Positive for difficulty urinating.        William catheter removed this morning; no urine output.    Skin:  Negative for color change.   Neurological:  Negative for headaches.   Psychiatric/Behavioral:  Negative for agitation.      Objective:     Vital Signs (Most Recent):  Temp: 98.8 °F (37.1 °C) (08/01/23 1030)  Pulse: 71 (08/01/23 1030)  Resp: 18 (08/01/23 1030)  BP: 133/71 (08/01/23 1030)  SpO2: 95 % (08/01/23 1030) Vital Signs (24h Range):  Temp:  [97.8 °F (36.6 °C)-98.8 °F (37.1 °C)] 98.8 °F (37.1 °C)  Pulse:  [71-80] 71  Resp:  [16-18] 18  SpO2:  [95 %-97 %] 95 %  BP: (132-142)/(60-80) 133/71     Weight: 119.7 kg (263 lb 14.3 oz)  Body mass index is 32.98 kg/m².    Intake/Output Summary (Last 24 hours) at 8/1/2023 1209  Last data filed at 8/1/2023 0610  Gross per 24 hour   Intake 600 ml   Output 3200 ml   Net -2600 ml         Physical Exam  Constitutional:       General: He is not in acute distress.     Appearance: He is not ill-appearing.   HENT:      Head: Normocephalic and  atraumatic.   Eyes:      Extraocular Movements: Extraocular movements intact.   Cardiovascular:      Rate and Rhythm: Normal rate.      Pulses: Normal pulses.      Heart sounds: Normal heart sounds. No murmur heard.     No friction rub. No gallop.   Pulmonary:      Effort: Pulmonary effort is normal. No respiratory distress.      Breath sounds: Normal breath sounds. No stridor. No wheezing or rhonchi.   Abdominal:      General: There is no distension.      Palpations: There is no mass.      Tenderness: There is no abdominal tenderness.   Musculoskeletal:         General: Swelling present. No tenderness.      Cervical back: Normal range of motion and neck supple.      Right lower leg: No edema.      Left lower leg: No edema.      Comments: Right lower extremity swelling   Skin:     Capillary Refill: Capillary refill takes less than 2 seconds.      Coloration: Skin is not jaundiced or pale.   Neurological:      Mental Status: He is alert and oriented to person, place, and time.           Significant Labs: All pertinent labs within the past 24 hours have been reviewed.  Recent Lab Results         08/01/23  0322        Albumin/Globulin Ratio 0.5       Albumin 2.0       Alkaline Phosphatase 51              Anion Gap 9              Baso # 0.03       Basophil % 0.3       BILIRUBIN TOTAL 0.9       BUN 15       BUN/CREAT RATIO 16       Calcium 8.1       Chloride 98       CO2 28       Creatinine 0.92       Differential Type Auto       eGFR 109       Eos # 0.04       Eosinophil % 0.3       Globulin, Total 3.9       Glucose 114       Hematocrit 38.3       Hemoglobin 13.2       Immature Grans (Abs) 0.14       Immature Granulocytes 1.2       Lymph # 1.36       Lymph % 11.4       MCH 31.4       MCHC 34.5       MCV 91.0       Mono # 1.23       Mono % 10.4       MPV 9.5       Neutrophils, Abs 9.08       Neutrophils Relative 76.4       nRBC 0.0       NUCLEATED RBC ABSOLUTE 0.00       Platelets 196       Potassium 3.9        PROTEIN TOTAL 5.9       RBC 4.21       RDW 12.5       Sodium 131       WBC 11.88               Significant Imaging: I have reviewed all pertinent imaging results/findings within the past 24 hours.

## 2023-08-01 NOTE — ASSESSMENT & PLAN NOTE
08/01/2023  - Liver enzymes trending in the right direction  -CIWA score 1  - Continue Ativan 2mg PRN for withdrawal precaution        07/31/2023  -Discontinued chlordiazepoxide 25 q8hr for elevated LFTs and CIWA is a 4  - Liver enzymes are trending down AST 1,121;   CIWA score 4 points today; last CIWA was 1 point   -Continue Ativan PRN for withdrawals           Patient reportedly drinking >6 beers per day  Ativan PRN for withdrawal   CIWA score 1 point    7/30  - continue ativan prn for withdrawal  - start chlordiazepoxide 25 q8hr; coverage for withdrawal/anxiety

## 2023-08-01 NOTE — PT/OT/SLP PROGRESS
"Physical Therapy Treatment    Patient Name:  Raj Watkins   MRN:  [id number]    Recommendations:     Discharge Recommendations: home  Discharge Equipment Recommendations: other (see comments) (to be determined)  Barriers to discharge:  decreased functional mobility; uncontrolled pain, ongoing medical treatment    Assessment:     Raj Watkins is a 38 y.o. male admitted with a medical diagnosis of fall in bathtub, exacerbation of back pain, weakness.  He presents with the following impairments/functional limitations: weakness, impaired endurance, impaired self care skills, impaired functional mobility, gait instability, impaired balance, decreased lower extremity function, decreased upper extremity function, pain, impaired skin, edema .    Patient demonstrated improved movement of B LE compared with evaluation but still very limited R>L. Patient reports primary pain location is right hip. Improved sit to stand but pt unable to ambulate. Patient reports severe discomfort with bladder as catheter out at 5:30 am and pt still unable to void, pain edema R UE, and small blisters/wound lateral R LE patient would like addressed- Walt Schwartz LPN advised of patient concerns. PT will continue to follow to promote return to independence. Patient with no insurance so d/c disposition will have to be home.    Rehab Prognosis: Good; patient would benefit from acute skilled PT services to address these deficits and reach maximum level of function.    Recent Surgery: * No surgery found *      Plan:     During this hospitalization, patient to be seen 5 x/week to address the identified rehab impairments via gait training, therapeutic activities, therapeutic exercises and progress toward the following goals:    Plan of Care Expires:  08/31/23    Subjective     Chief Complaint: Fall in bathtub with overnight stay, exacerbation of chronic back pain and severe decline in functional mobility  Patient/Family Comments/goals: "I am a " "little better today. Can I put on my lounge pants now that the catheter is out? I have not been able to pee since they took the catheter out."  Pain/Comfort:  Pain Rating 1: 5/10 (up to 8/10 with certain movements)  Location - Side 1: Right  Location 1: hip  Pain Addressed 1: Pre-medicate for activity, Reposition, Distraction, Cessation of Activity  Pain Rating Post-Intervention 1: 5/10      Objective:     Communicated with Walt Schwartz LPN prior to session.  Patient found supine with peripheral IV upon PT entry to room.     General Precautions: Standard, fall  Orthopedic Precautions: N/A  Braces: N/A  Respiratory Status: Room air     Functional Mobility:  Bed Mobility:     Rolling Left:  minimum assistance x 2  Rolling Right: minimum assistance x 2  Supine to Sit: minimum assistance and of 2 persons  Sit to Supine: moderate assistance and of 2 persons  Transfers:     Sit to Stand:  moderate assistance and of 2 persons with rolling walker  Bed to Chair:  unable    Gait: attempted but pt unable to take steps      AM-PAC 6 CLICK MOBILITY  Turning over in bed (including adjusting bedclothes, sheets and blankets)?: 2  Sitting down on and standing up from a chair with arms (e.g., wheelchair, bedside commode, etc.): 2  Moving from lying on back to sitting on the side of the bed?: 2  Moving to and from a bed to a chair (including a wheelchair)?: 1  Need to walk in hospital room?: 1  Climbing 3-5 steps with a railing?: 1  Basic Mobility Total Score: 9       Treatment & Education:  Bilateral lower extremity exercise x 15 reps: heel slides, hip abduction/adduction, and straight leg raises with active assist ROM, verbal cues, and tactile cues Better ROM L LE than R LE  Transfers as above  Sitting EOB SBA; increased RR and c/o pain but no screaming today  Static stance with RW- CGA once upright.    Patient left supine with all lines intact, call button in reach, and Walt Schwartz LPN notified..    GOALS: "   Multidisciplinary Problems       Physical Therapy Goals          Problem: Physical Therapy    Goal Priority Disciplines Outcome Goal Variances Interventions   Physical Therapy Goal     PT, PT/OT Ongoing, Progressing     Description: Short Term Goals to be met by: 2023    Patient will increase functional independence with mobility by performin. Supine to sit with independently  2. Sit to stand transfer with independently using lowest level of assistive device  3. Bed to chair transfer with independently using lowest level of assistive device  4. Gait  x 100 feet with independently using lowest level of assistive device  5. Lower extremity exercise program x30 reps per handout, with assistance as needed    Long Term Goals to be met by: 2023    Pt will regain full independent functional mobility with lowest level of assistive device to return to home situation and prior activities of daily living.                        Time Tracking:     PT Received On: 23  PT Start Time: 1100     PT Stop Time: 1125  PT Total Time (min): 25 min     Billable Minutes: Therapeutic Activity 25 minutes    Treatment Type: Treatment  PT/PTA: PT     Number of PTA visits since last PT visit: 0     2023

## 2023-08-01 NOTE — ASSESSMENT & PLAN NOTE
08/01/2023  - Hyponatremia has improved; Sodium 131  -Will continue to monitor        07/31/2023  - YANNICK has resolved  -Cr 1.16  -BUN 16  - Continue to monitor hyponatremia of 124; will replenish as needed             Patient with acute kidney injury/acute renal failure likely due to pre-renal azotemia due to dehydration YANNICK is currently stable. Baseline creatinine unknown - Labs reviewed- Renal function/electrolytes with Estimated Creatinine Clearance: 150.3 mL/min (based on SCr of 0.92 mg/dL). according to latest data. Monitor urine output and serial BMP and adjust therapy as needed. Avoid nephrotoxins and renally dose meds for GFR listed above.    Creatinine 2.15 at [facility]     7/30  - likely secondary to rhabdomyolysis vs dehydration  - con't ivf  - cr stable

## 2023-08-02 LAB
ALBUMIN SERPL BCP-MCNC: 1.9 G/DL (ref 3.5–5)
ALBUMIN/GLOB SERPL: 0.5 {RATIO}
ALP SERPL-CCNC: 52 U/L (ref 45–115)
ALT SERPL W P-5'-P-CCNC: 300 U/L (ref 16–61)
ANION GAP SERPL CALCULATED.3IONS-SCNC: 10 MMOL/L (ref 7–16)
AST SERPL W P-5'-P-CCNC: 528 U/L (ref 15–37)
BASOPHILS # BLD AUTO: 0.08 K/UL (ref 0–0.2)
BASOPHILS NFR BLD AUTO: 0.8 % (ref 0–1)
BILIRUB SERPL-MCNC: 0.8 MG/DL (ref ?–1.2)
BUN SERPL-MCNC: 17 MG/DL (ref 7–18)
BUN/CREAT SERPL: 18 (ref 6–20)
CALCIUM SERPL-MCNC: 7.9 MG/DL (ref 8.5–10.1)
CHLORIDE SERPL-SCNC: 98 MMOL/L (ref 98–107)
CO2 SERPL-SCNC: 28 MMOL/L (ref 21–32)
CREAT SERPL-MCNC: 0.94 MG/DL (ref 0.7–1.3)
DIFFERENTIAL METHOD BLD: ABNORMAL
EGFR (NO RACE VARIABLE) (RUSH/TITUS): 106 ML/MIN/1.73M2
EOSINOPHIL # BLD AUTO: 0.1 K/UL (ref 0–0.5)
EOSINOPHIL NFR BLD AUTO: 0.9 % (ref 1–4)
ERYTHROCYTE [DISTWIDTH] IN BLOOD BY AUTOMATED COUNT: 12.1 % (ref 11.5–14.5)
GLOBULIN SER-MCNC: 3.7 G/DL (ref 2–4)
GLUCOSE SERPL-MCNC: 130 MG/DL (ref 74–106)
HCT VFR BLD AUTO: 38.6 % (ref 40–54)
HGB BLD-MCNC: 13.1 G/DL (ref 13.5–18)
IMM GRANULOCYTES # BLD AUTO: 0.27 K/UL (ref 0–0.04)
IMM GRANULOCYTES NFR BLD: 2.6 % (ref 0–0.4)
LYMPHOCYTES # BLD AUTO: 1.51 K/UL (ref 1–4.8)
LYMPHOCYTES NFR BLD AUTO: 14.3 % (ref 27–41)
MCH RBC QN AUTO: 31.5 PG (ref 27–31)
MCHC RBC AUTO-ENTMCNC: 33.9 G/DL (ref 32–36)
MCV RBC AUTO: 92.8 FL (ref 80–96)
MONOCYTES # BLD AUTO: 1.12 K/UL (ref 0–0.8)
MONOCYTES NFR BLD AUTO: 10.6 % (ref 2–6)
MPC BLD CALC-MCNC: 8.9 FL (ref 9.4–12.4)
NEUTROPHILS # BLD AUTO: 7.48 K/UL (ref 1.8–7.7)
NEUTROPHILS NFR BLD AUTO: 70.8 % (ref 53–65)
NRBC # BLD AUTO: 0 X10E3/UL
NRBC, AUTO (.00): 0 %
PLATELET # BLD AUTO: 204 K/UL (ref 150–400)
POTASSIUM SERPL-SCNC: 3.5 MMOL/L (ref 3.5–5.1)
PROT SERPL-MCNC: 5.6 G/DL (ref 6.4–8.2)
RBC # BLD AUTO: 4.16 M/UL (ref 4.6–6.2)
SODIUM SERPL-SCNC: 132 MMOL/L (ref 136–145)
WBC # BLD AUTO: 10.56 K/UL (ref 4.5–11)

## 2023-08-02 PROCEDURE — 63600175 PHARM REV CODE 636 W HCPCS

## 2023-08-02 PROCEDURE — 99232 PR SUBSEQUENT HOSPITAL CARE,LEVL II: ICD-10-PCS | Mod: GC,,, | Performed by: STUDENT IN AN ORGANIZED HEALTH CARE EDUCATION/TRAINING PROGRAM

## 2023-08-02 PROCEDURE — 11000001 HC ACUTE MED/SURG PRIVATE ROOM

## 2023-08-02 PROCEDURE — 25000003 PHARM REV CODE 250: Performed by: STUDENT IN AN ORGANIZED HEALTH CARE EDUCATION/TRAINING PROGRAM

## 2023-08-02 PROCEDURE — 97166 OT EVAL MOD COMPLEX 45 MIN: CPT

## 2023-08-02 PROCEDURE — 25000003 PHARM REV CODE 250

## 2023-08-02 PROCEDURE — 27000577 HC CATH, FOLEY TRAY W/ URINE METER

## 2023-08-02 PROCEDURE — 97530 THERAPEUTIC ACTIVITIES: CPT

## 2023-08-02 PROCEDURE — 25000003 PHARM REV CODE 250: Performed by: FAMILY MEDICINE

## 2023-08-02 PROCEDURE — 63600175 PHARM REV CODE 636 W HCPCS: Performed by: INTERNAL MEDICINE

## 2023-08-02 PROCEDURE — 63600175 PHARM REV CODE 636 W HCPCS: Performed by: STUDENT IN AN ORGANIZED HEALTH CARE EDUCATION/TRAINING PROGRAM

## 2023-08-02 PROCEDURE — 85025 COMPLETE CBC W/AUTO DIFF WBC: CPT

## 2023-08-02 PROCEDURE — 63600175 PHARM REV CODE 636 W HCPCS: Performed by: FAMILY MEDICINE

## 2023-08-02 PROCEDURE — 80053 COMPREHEN METABOLIC PANEL: CPT

## 2023-08-02 PROCEDURE — 99232 SBSQ HOSP IP/OBS MODERATE 35: CPT | Mod: GC,,, | Performed by: STUDENT IN AN ORGANIZED HEALTH CARE EDUCATION/TRAINING PROGRAM

## 2023-08-02 RX ORDER — BACITRACIN ZINC AND POLYMYXIN B SULFATE 500; 1000 [USP'U]/G; [USP'U]/G
OINTMENT TOPICAL 2 TIMES DAILY
Status: DISCONTINUED | OUTPATIENT
Start: 2023-08-02 | End: 2023-08-03 | Stop reason: HOSPADM

## 2023-08-02 RX ORDER — MORPHINE SULFATE 4 MG/ML
4 INJECTION, SOLUTION INTRAMUSCULAR; INTRAVENOUS EVERY 4 HOURS PRN
Status: DISCONTINUED | OUTPATIENT
Start: 2023-08-02 | End: 2023-08-03

## 2023-08-02 RX ADMIN — SODIUM CHLORIDE, POTASSIUM CHLORIDE, SODIUM LACTATE AND CALCIUM CHLORIDE: 600; 310; 30; 20 INJECTION, SOLUTION INTRAVENOUS at 02:08

## 2023-08-02 RX ADMIN — OXYCODONE HYDROCHLORIDE 5 MG: 5 TABLET ORAL at 09:08

## 2023-08-02 RX ADMIN — OXYCODONE HYDROCHLORIDE 5 MG: 5 TABLET ORAL at 04:08

## 2023-08-02 RX ADMIN — LORAZEPAM 1 MG: 1 TABLET ORAL at 04:08

## 2023-08-02 RX ADMIN — MORPHINE SULFATE 4 MG: 4 INJECTION, SOLUTION INTRAMUSCULAR; INTRAVENOUS at 10:08

## 2023-08-02 RX ADMIN — ENOXAPARIN SODIUM 40 MG: 100 INJECTION SUBCUTANEOUS at 04:08

## 2023-08-02 RX ADMIN — MORPHINE SULFATE 2 MG: 2 INJECTION, SOLUTION INTRAMUSCULAR; INTRAVENOUS at 07:08

## 2023-08-02 RX ADMIN — MUPIROCIN: 20 OINTMENT TOPICAL at 09:08

## 2023-08-02 RX ADMIN — BACITRACIN ZINC AND POLYMYXIN B SULFATE: at 09:08

## 2023-08-02 RX ADMIN — TAMSULOSIN HYDROCHLORIDE 0.4 MG: 0.4 CAPSULE ORAL at 09:08

## 2023-08-02 RX ADMIN — Medication 6 MG: at 09:08

## 2023-08-02 RX ADMIN — MORPHINE SULFATE 2 MG: 2 INJECTION, SOLUTION INTRAMUSCULAR; INTRAVENOUS at 12:08

## 2023-08-02 RX ADMIN — LORAZEPAM 1 MG: 1 TABLET ORAL at 09:08

## 2023-08-02 NOTE — ASSESSMENT & PLAN NOTE
08/03/2023  - Removed william with a urine output of 1300cc  -Plan to d/c tomorrow  -Follow-up with Dr. Chandler(Urologist)  for outpatient urinary retention in 1-week    08/02/2023  - Continue to monitor; no major concerns at this time    08/01/2023  - Hyponatremia has improved; Sodium 131  -Will continue to monitor        07/31/2023  - YANNICK has resolved  -Cr 1.16  -BUN 16  - Continue to monitor hyponatremia of 124; will replenish as needed             Patient with acute kidney injury/acute renal failure likely due to pre-renal azotemia due to dehydration YANNICK is currently stable. Baseline creatinine unknown - Labs reviewed- Renal function/electrolytes with Estimated Creatinine Clearance: 147.1 mL/min (based on SCr of 0.94 mg/dL). according to latest data. Monitor urine output and serial BMP and adjust therapy as needed. Avoid nephrotoxins and renally dose meds for GFR listed above.    Creatinine 2.15 at [facility]     7/30  - likely secondary to rhabdomyolysis vs dehydration  - con't ivf  - cr stable

## 2023-08-02 NOTE — PT/OT/SLP PROGRESS
"Physical Therapy Treatment    Patient Name:  Raj Watkins   MRN:  [id number]    Recommendations:     Discharge Recommendations: home  Discharge Equipment Recommendations: other (see comments) (to be determined)  Barriers to discharge: Decreased caregiver support and decreased functional mobility    Assessment:     Raj Watkins is a 38 y.o. male admitted with a medical diagnosis of severe pain post fall.  He presents with the following impairments/functional limitations: weakness, impaired endurance, impaired self care skills, impaired functional mobility, gait instability, impaired balance, decreased lower extremity function, decreased upper extremity function, decreased safety awareness, pain, decreased ROM, impaired skin, edema .    Patient with c/o sever pain right hip/back during transfers with intermittent screaming. Patient unable to offload enough weight with Ue's to be able to ambulate. After mobility/stance, patient too painful to complete LE exercises. Patient unable to d/c home at this time due to extreme limitation in mobility. Patient with no insurance so swing bed is not an option.    Rehab Prognosis: Good; patient would benefit from acute skilled PT services to address these deficits and reach maximum level of function.    Recent Surgery: * No surgery found *      Plan:     During this hospitalization, patient to be seen 5 x/week to address the identified rehab impairments via gait training, therapeutic activities, therapeutic exercises and progress toward the following goals:    Plan of Care Expires:  09/01/23    Subjective     Chief Complaint: fall in bathtub with overnight stay and severe back/right hip pain  Patient/Family Comments/goals: "There will be no one at home to help me at d/c."  Pain/Comfort:  Pain Rating 1: 5/10 (at rest; 10/10 during trasfner to EOB and stance)  Location - Side 1: Right  Location 1: hip  Pain Addressed 1: Pre-medicate for activity, Reposition, Distraction, " Cessation of Activity  Pain Rating Post-Intervention 1: 8/10      Objective:     Communicated with Walt Schwartz LPN prior to session.  Patient found supine with peripheral IV upon PT entry to room.     General Precautions: Standard, fall  Orthopedic Precautions: N/A  Braces: N/A  Respiratory Status: Room air     Functional Mobility:  Bed Mobility:     Rolling Left:  minimum assistance  Rolling Right: minimum assistance  Supine to Sit: stand by assistance and increased time and effort  Sit to Supine: minimum assistance  Transfers:     Sit to Stand:  minimum assistance and of 2 persons with rolling walker and multiple attempts before patient able to complete the transfer  Bed to Chair: unable  Gait: unable      AM-PAC 6 CLICK MOBILITY  Turning over in bed (including adjusting bedclothes, sheets and blankets)?: 3  Sitting down on and standing up from a chair with arms (e.g., wheelchair, bedside commode, etc.): 3  Moving from lying on back to sitting on the side of the bed?: 3  Moving to and from a bed to a chair (including a wheelchair)?: 1  Need to walk in hospital room?: 1  Climbing 3-5 steps with a railing?: 1  Basic Mobility Total Score: 12       Treatment & Education:  Transfers as above  After mobility patient too painful to complete LE exercises    Patient left supine with all lines intact, call button in reach, bed alarm on, and Walt Schwartz LPN notified..    GOALS:   Multidisciplinary Problems       Physical Therapy Goals          Problem: Physical Therapy    Goal Priority Disciplines Outcome Goal Variances Interventions   Physical Therapy Goal     PT, PT/OT Ongoing, Progressing     Description: Short Term Goals to be met by: 2023    Patient will increase functional independence with mobility by performin. Supine to sit with independently  2. Sit to stand transfer with independently using lowest level of assistive device  3. Bed to chair transfer with independently using lowest level of  assistive device  4. Gait  x 100 feet with independently using lowest level of assistive device  5. Lower extremity exercise program x30 reps per handout, with assistance as needed    Long Term Goals to be met by: 8/31/2023    Pt will regain full independent functional mobility with lowest level of assistive device to return to home situation and prior activities of daily living.                        Time Tracking:     PT Received On: 08/02/23  PT Start Time: 1046     PT Stop Time: 1058  PT Total Time (min): 12 min     Billable Minutes: Therapeutic Activity 12 minutes    Treatment Type: Treatment  PT/PTA: PT     Number of PTA visits since last PT visit: 0     08/02/2023

## 2023-08-02 NOTE — PT/OT/SLP EVAL
Occupational Therapy   Evaluation    Name: Raj Watkins  MRN: [id number]  Admitting Diagnosis: <principal problem not specified>  Recent Surgery: * No surgery found *      Recommendations:     Discharge Recommendations: home  Discharge Equipment Recommendations:   (to be determined)  Barriers to discharge:  None    Assessment:     Raj Watkins is a 38 y.o. male with a medical diagnosis of <principal problem not specified>.  He presents with complaint of LE pain. Pt agreed to OT evaluation. Performance deficits affecting function: weakness, impaired endurance, impaired self care skills, impaired functional mobility, gait instability, decreased lower extremity function, decreased upper extremity function, pain.      Pt very painful and yelling throughout mobility. Pt able to sit EOB and stand, but unable to transfer to chair due to pain. Pt returned to bed.    Rehab Prognosis: Good; patient would benefit from acute skilled OT services to address these deficits and reach maximum level of function.       Plan:     Patient to be seen 5 x/week to address the above listed problems via self-care/home management, therapeutic activities, therapeutic exercises  Plan of Care Expires:    Plan of Care Reviewed with: patient    Subjective     Chief Complaint: primary HTN  Patient/Family Comments/goals: To return home    Occupational Profile:  Living Environment: Pt lives at home with mother in 1 story home with 2 to 3 steps no rail  Previous level of function: pt reports being (I) with self care prior  Roles and Routines: I with daily activities prior  Equipment Used at Home: none  Assistance upon Discharge: Mother    Pain/Comfort:  Pain Rating 1: 7/10  Location - Side 1: Bilateral  Location 1: hip  Pain Rating Post-Intervention 1: 10/10    Patients cultural, spiritual, Gnosticism conflicts given the current situation: no    Objective:     Communicated with: MANFRED Schwartz prior to session.  Patient found HOB elevated  with peripheral IV upon OT entry to room.    General Precautions: Standard, fall  Orthopedic Precautions: N/A  Braces: N/A  Respiratory Status: Room air    Occupational Performance:    Bed Mobility:    Patient completed Supine to Sit with stand by assistance and increased time and effort due to pain  Patient completed Sit to Supine with minimum assistance for LE management    Functional Mobility/Transfers:  Patient completed Sit <> Stand Transfer with minimum assistance  with  gait belt to stand to RW   Functional Mobility: NT    Activities of Daily Living:  Upper Body Dressing: minimum assistance donning gown as a robe    Cognitive/Visual Perceptual:  Cognitive/Psychosocial Skills:     -       Oriented to: Person, Place, and Situation   -       Follows Commands/attention:Follows one-step commands  -       Communication: clear/fluent  Visual/Perceptual:      -Intact      Physical Exam:  Balance:    -       SBA with static sitting, Min a with dynamic balance  Skin integrity: Visible skin intact  Edema:  Moderate (L) forearm  Sensation:    -       Intact  Motor Planning:    -       wfl  Dominant hand:    -       Right  Upper Extremity Range of Motion:     -       Right Upper Extremity: WFL  -       Left Upper Extremity: WFL  Upper Extremity Strength:    -       Right Upper Extremity: WFL 5/5  -       Left Upper Extremity: WFL  4/5   Strength:    -       Right Upper Extremity: WFL  -       Left Upper Extremity: WFL    AMPAC 6 Click ADL:  AMPAC Total Score: 16    Treatment & Education:  OT evaluation completed. See eval for detail.  Pt educated on OT role/POC.   Importance of OOB activity with staff assistance.  Importance of sitting up in the chair throughout the day as tolerated, especially for meals   Safety during functional t/f and mobility with use of RW  Importance of assisting with self-care activities   All questions/concerns answered within OT scope of practice     Patient left HOB elevated with all lines  intact and call button in reach    GOALS:   Multidisciplinary Problems       Occupational Therapy Goals          Problem: Occupational Therapy    Goal Priority Disciplines Outcome Interventions   Occupational Therapy Goal     OT, PT/OT Ongoing, Progressing    Description: STG:  Pt will perform grooming with setup  Pt will bathe with Setup with AD as needed  Pt will perform UE dressing with (I)  Pt will perform LE dressing with I/Mod I  Pt will sit EOB x 5 min (I)  Pt will transfer bed/chair/bsc with Mod I  Pt will perform standing task x 2 min with Mod I   Pt will tolerate 15 minutes of tx without fatigue      LT.Restore to max I with self care and mobility.                          History:     No past medical history on file.    No past surgical history on file.    Time Tracking:     OT Date of Treatment: 23  OT Start Time: 1041  OT Stop Time: 1057  OT Total Time (min): 16 min    Billable Minutes:Evaluation 16    2023

## 2023-08-02 NOTE — ASSESSMENT & PLAN NOTE
08/02/2023  -Seen by PT with improvement; noticed right LE blisters/wound. Started on bacitracin ointment     08/01/2023  -Seen by PT; recommended rehabilitation to regain independent function; We appreciate your recommendations  -Lumbar MRI showed degenerative disc disease and L3-4 stenosis  will continue to treat chronic pain           7/31/2023  -Patient reports hip tenderness and weakness  - Ordered PT for evaluate and treat   - Will continue to monitor       7/30  Patient reportedly fell at home in Bathtub for unknown amount of time  Back and Hip pain along with rhabdomyolysis  CT Cervical, Thoracic and Pelvis without contrast - negative for any acute process.   Lumbar CT - Multilevel degenerative change of the lumbar spine.

## 2023-08-02 NOTE — NURSING
Avelar removed. 600ml dark yellow urine in bag. Will monitor for voiding within 6 hours to determine if he will go home today      1600: Patient continued to complain of not being able to urinate. Bladder scan completed showing 716mls. Avelar reinserted and removed 1000mls of urine. Dr. Preciado notified    1620: Called ext [phone] to set up appointment with Dr. Chandler for next week. Spoke with carmel, she said she would call back after speaking with Dr. Chandler.     1640: Call back from Dr. Chandler, appointment has been made with his NP

## 2023-08-02 NOTE — PROGRESS NOTES
[facility] - [address] [place] [address] Medicine  Progress Note    Patient Name: Raj Watkins  MRN: [id number]  Patient Class: IP- Inpatient   Admission Date: 7/29/2023  Length of Stay: 4 days  Attending Physician: Adan Preciado DO  Primary Care Provider: Primary Doctor Thu        Subjective:     Principal Problem:<principal problem not specified>        HPI:  Patient is a 38 year old male with a past medical history of diabetes mellitus, low back pain, and hypertension who is a direct admit of [facility]. Patient presents with bilateral hip pain following a fall. He states that last night he starting thing about an ongoing family issue and started drinking. He also endorsed using methamphetamines. Of note, patient has a history of IVDU specifically methamphetamine, as well as, alcohol use. He states he has been drinking about 7-9 beers per night. Last night, after drinking and using methamphetamines he got up to go to the restroom and fell into the tub. Denies any head trauma. Patient estimates he was unconscious throughout the night (approximately 4 + hours) in a hot bathroom. When he awoke his legs were crossed and hanging over the edge of the tub. Upon attempting to sit up on the edge of the tube he noted severe bilateral hip pain. EMS was alerted and he was transport to [facility] emergency department.     In the ED at [facility], his vital were /81, T 36.4 C. Labs were significant for WBC 31451, , CK > 1000, myoglobin > 1000. Patient was given 3L NS, magnesium sulfate 4g IV, and cath.     In the ambulance, patient put out 1700 ml of dark brown urine, was given Fentanyl 100, and Zofran 4mg. Patient was admitted directly to the hospialist service at [facility]           Overview/Hospital Course:  Patient is a 38 year old male with a past medical history of diabetes mellitus, low back pain, and hypertension who is a direct admit of [facility]  [facility]. Patient presents with bilateral hip pain following a fall. He states that last night he starting thing about an ongoing family issue and started drinking. He also endorsed using methamphetamines. Of note, patient has a history of IVDU specifically methamphetamine, as well as, alcohol use.   In the ED at [facility], his vital were /81, T 36.4 C. Labs were significant for WBC 06360, , CK > 1000, myoglobin > 1000. Patient was given 3L NS, magnesium sulfate 4g IV, and cath. Patient complications has resolved enough to be discharged. Patient will follow-up with Dr. Chandler(urologist) outpatient in a week to remove william cathter and further work-up for urinary retention.     08/02/2023  Patient was seen and examined at bedside. No acute events overnight. Patients reports bilateral hip tenderness and groin tingling. Noted on physical examine right-side hip blisters with  tenderness with pressure. Right leg swelling; ultrasound doppler for DVT was negative. Urine output 200cc with william. Vitals are unremarkable. Leukocytosis has resolved 10.56; sodium 132; ;. Denies angina , dyspnea, headache, or abdominal discomfort.      07/31/2023: Raj Knutson was seen and examined at bedside. Patient is A&O X2( name and year); He reports weakness, low appetite, and body tenderness. Patient urine output today 1200cc. CIWA 4( mild nausea, mild anxious, and mild itching).  Patient denies chest pain, SOB, visual, auditory, or tactile hallucinations.       Interval History: Patient was seen and examined at bedside. No acute events overnight. Patients reports bilateral hip tenderness and groin tingling. Noted on physical examine right-side hip blisters with  tenderness with pressure. Right leg swelling; ultrasound doppler for DVT was negative. Urine output 200cc with william. Vitals are unremarkable. Leukocytosis has resolved 10.56; sodium 132; ;. Denies angina , dyspnea, headache, or  abdominal discomfort.      Review of Systems   Respiratory:  Negative for shortness of breath.    Cardiovascular:  Negative for chest pain.   Gastrointestinal:  Negative for abdominal pain.   Genitourinary:  Negative for decreased urine volume and difficulty urinating.   Musculoskeletal:  Negative for back pain.     Objective:     Vital Signs (Most Recent):  Temp: 97.9 °F (36.6 °C) (08/02/23 0400)  Pulse: 93 (08/02/23 0400)  Resp: 18 (08/02/23 0925)  BP: 138/73 (08/02/23 0400)  SpO2: 95 % (08/02/23 0400) Vital Signs (24h Range):  Temp:  [97.9 °F (36.6 °C)-98.8 °F (37.1 °C)] 97.9 °F (36.6 °C)  Pulse:  [62-93] 93  Resp:  [16-20] 18  SpO2:  [95 %-97 %] 95 %  BP: (128-138)/(73-81) 138/73     Weight: 119.7 kg (263 lb 14.3 oz)  Body mass index is 32.98 kg/m².    Intake/Output Summary (Last 24 hours) at 8/2/2023 1102  Last data filed at 8/2/2023 1044  Gross per 24 hour   Intake 860 ml   Output 33880 ml   Net -9990 ml         Physical Exam  Constitutional:       General: He is not in acute distress.     Appearance: Normal appearance. He is not ill-appearing.   HENT:      Head: Normocephalic and atraumatic.   Cardiovascular:      Rate and Rhythm: Normal rate.      Pulses: Normal pulses.      Heart sounds: Normal heart sounds. No murmur heard.     No friction rub. No gallop.   Pulmonary:      Effort: Pulmonary effort is normal. No respiratory distress.      Breath sounds: Normal breath sounds. No wheezing or rhonchi.   Abdominal:      General: There is no distension.      Palpations: Abdomen is soft. There is no mass.      Tenderness: There is no abdominal tenderness.   Musculoskeletal:         General: Swelling present. No tenderness.      Comments: Right leg swelling   Skin:     Findings: Rash present.      Comments: Left hip multiple blisters with eruptions   Neurological:      Mental Status: He is alert.         Significant Labs: All pertinent labs within the past 24 hours have been reviewed.    Significant Imaging: I  have reviewed all pertinent imaging results/findings within the past 24 hours.      Assessment/Plan:      Rhabdomyolysis  08/01/2023  - Rhabdomyolysis is improving  -Decreased urine output; ordered bedside bladder scan; results 509 mL   - Ordered a straight cath  -Flomax 0.8mg       07/31/2023  - Repeated CPK and Myoglobin; 24,240 and 5,787 respectively; trending  in the right direction  -Continue IVF resuscitation;  mL/Hr  -Continue urine output      Transaminitis  08/03/2023  - ; no concerns at this time   08/01/2023  - ;     07/31/2023  - AST 1,121;   -Repeat CMP tomorrow        At [facility] AST 2026,   Repeat CMP      Elevated troponin I level  07/31/2023  - Troponin has resolved   - No chest pain      Troponin at [facility] 94.0  Trend troponin   No chest pain    7/30  - repeat troponins trending down  - no chest pain   - echo showed normal right ventricle and aortic valve with no pericardial effusion  - telemetry showed normal sinus rhythm, con't monitoring    YANNICK (acute kidney injury)  08/03/2023  - Removed william with a urine output of 1300cc  -Plan to d/c tomorrow  -Follow-up with Dr. Chandler(Urologist)  for outpatient urinary retention in 1-week    08/02/2023  - Continue to monitor; no major concerns at this time    08/01/2023  - Hyponatremia has improved; Sodium 131  -Will continue to monitor        07/31/2023  - YANNICK has resolved  -Cr 1.16  -BUN 16  - Continue to monitor hyponatremia of 124; will replenish as needed             Patient with acute kidney injury/acute renal failure likely due to pre-renal azotemia due to dehydration YANNICK is currently stable. Baseline creatinine unknown - Labs reviewed- Renal function/electrolytes with Estimated Creatinine Clearance: 147.1 mL/min (based on SCr of 0.94 mg/dL). according to latest data. Monitor urine output and serial BMP and adjust therapy as needed. Avoid nephrotoxins and renally dose meds for GFR listed  above.    Creatinine 2.15 at [facility]     7/30  - likely secondary to rhabdomyolysis vs dehydration  - con't ivf  - cr stable     Bilateral hip pain  08/02/2023  -Seen by PT with improvement; noticed right LE blisters/wound. Started on bacitracin ointment     08/01/2023  -Seen by PT; recommended rehabilitation to regain independent function; We appreciate your recommendations  -Lumbar MRI showed degenerative disc disease and L3-4 stenosis  will continue to treat chronic pain           7/31/2023  -Patient reports hip tenderness and weakness  - Ordered PT for evaluate and treat   - Will continue to monitor       7/30  Patient reportedly fell at home in Bathtub for unknown amount of time  Back and Hip pain along with rhabdomyolysis  CT Cervical, Thoracic and Pelvis without contrast - negative for any acute process.   Lumbar CT - Multilevel degenerative change of the lumbar spine.       Uncomplicated alcohol dependence  08/01/2023  - Liver enzymes trending in the right direction  -CIWA score 1  - Continue Ativan 2mg PRN for withdrawal precaution        07/31/2023  -Discontinued chlordiazepoxide 25 q8hr for elevated LFTs and CIWA is a 4  - Liver enzymes are trending down AST 1,121;   CIWA score 4 points today; last CIWA was 1 point   -Continue Ativan PRN for withdrawals           Patient reportedly drinking >6 beers per day  Ativan PRN for withdrawal   CIWA score 1 point    7/30  - continue ativan prn for withdrawal  - start chlordiazepoxide 25 q8hr; coverage for withdrawal/anxiety        Primary hypertension  08/01/2023  -Blood pressure 132/80  - Continue to monitor      07/31/2023  - Blood pressure today 149/83; will continue to monitor           VTE Risk Mitigation (From admission, onward)         Ordered     enoxaparin injection 40 mg  Every 24 hours         08/01/23 1114     IP VTE LOW RISK PATIENT  Once         07/29/23 1745     Place sequential compression device  Until discontinued         07/29/23 3985                 Discharge Planning   DONNA:      Code Status: Full Code   Is the patient medically ready for discharge?:     Reason for patient still in hospital (select all that apply): Treatment  Discharge Plan A: Home with family                  Sal Carbajal MD  Department of Hospital Medicine   [facility] - [facility] Tele[facility]

## 2023-08-02 NOTE — ASSESSMENT & PLAN NOTE
08/03/2023  - ; no concerns at this time   08/01/2023  - ;     07/31/2023  - AST 1,121;   -Repeat CMP tomorrow        At [facility] AST 2026,   Repeat CMP

## 2023-08-02 NOTE — PLAN OF CARE
Problem: Occupational Therapy  Goal: Occupational Therapy Goal  Description: STG:  Pt will perform grooming with setup  Pt will bathe with Setup with AD as needed  Pt will perform UE dressing with (I)  Pt will perform LE dressing with I/Mod I  Pt will sit EOB x 5 min (I)  Pt will transfer bed/chair/bsc with Mod I  Pt will perform standing task x 2 min with Mod I   Pt will tolerate 15 minutes of tx without fatigue      LT.Restore to max I with self care and mobility.     Outcome: Ongoing, Progressing

## 2023-08-02 NOTE — SUBJECTIVE & OBJECTIVE
Interval History: Patient was seen and examined at bedside. No acute events overnight. Patients reports bilateral hip tenderness and groin tingling. Noted on physical examine right-side hip blisters with  tenderness with pressure. Right leg swelling; ultrasound doppler for DVT was negative. Urine output 200cc with william. Vitals are unremarkable. Leukocytosis has resolved 10.56; sodium 132; ;. Denies angina , dyspnea, headache, or abdominal discomfort.      Review of Systems   Respiratory:  Negative for shortness of breath.    Cardiovascular:  Negative for chest pain.   Gastrointestinal:  Negative for abdominal pain.   Genitourinary:  Negative for decreased urine volume and difficulty urinating.   Musculoskeletal:  Negative for back pain.     Objective:     Vital Signs (Most Recent):  Temp: 97.9 °F (36.6 °C) (08/02/23 0400)  Pulse: 93 (08/02/23 0400)  Resp: 18 (08/02/23 0925)  BP: 138/73 (08/02/23 0400)  SpO2: 95 % (08/02/23 0400) Vital Signs (24h Range):  Temp:  [97.9 °F (36.6 °C)-98.8 °F (37.1 °C)] 97.9 °F (36.6 °C)  Pulse:  [62-93] 93  Resp:  [16-20] 18  SpO2:  [95 %-97 %] 95 %  BP: (128-138)/(73-81) 138/73     Weight: 119.7 kg (263 lb 14.3 oz)  Body mass index is 32.98 kg/m².    Intake/Output Summary (Last 24 hours) at 8/2/2023 1102  Last data filed at 8/2/2023 1044  Gross per 24 hour   Intake 860 ml   Output 89612 ml   Net -9990 ml         Physical Exam  Constitutional:       General: He is not in acute distress.     Appearance: Normal appearance. He is not ill-appearing.   HENT:      Head: Normocephalic and atraumatic.   Cardiovascular:      Rate and Rhythm: Normal rate.      Pulses: Normal pulses.      Heart sounds: Normal heart sounds. No murmur heard.     No friction rub. No gallop.   Pulmonary:      Effort: Pulmonary effort is normal. No respiratory distress.      Breath sounds: Normal breath sounds. No wheezing or rhonchi.   Abdominal:      General: There is no distension.      Palpations:  Abdomen is soft. There is no mass.      Tenderness: There is no abdominal tenderness.   Musculoskeletal:         General: Swelling present. No tenderness.      Comments: Right leg swelling   Skin:     Findings: Rash present.      Comments: Left hip multiple blisters with eruptions   Neurological:      Mental Status: He is alert.         Significant Labs: All pertinent labs within the past 24 hours have been reviewed.    Significant Imaging: I have reviewed all pertinent imaging results/findings within the past 24 hours.

## 2023-08-03 VITALS
HEART RATE: 85 BPM | RESPIRATION RATE: 18 BRPM | TEMPERATURE: 99 F | OXYGEN SATURATION: 95 % | BODY MASS INDEX: 32.81 KG/M2 | SYSTOLIC BLOOD PRESSURE: 129 MMHG | HEIGHT: 75 IN | DIASTOLIC BLOOD PRESSURE: 72 MMHG | WEIGHT: 263.88 LBS

## 2023-08-03 PROBLEM — N39.0 CATHETER-ASSOCIATED URINARY TRACT INFECTION: Status: ACTIVE | Noted: 2023-08-03

## 2023-08-03 PROBLEM — N17.9 AKI (ACUTE KIDNEY INJURY): Status: RESOLVED | Noted: 2023-07-29 | Resolved: 2023-08-03

## 2023-08-03 PROBLEM — F19.11 HX OF SUBSTANCE ABUSE: Status: ACTIVE | Noted: 2018-06-25

## 2023-08-03 PROBLEM — F41.9 ANXIETY: Status: ACTIVE | Noted: 2018-06-25

## 2023-08-03 PROBLEM — I10 PRIMARY HYPERTENSION: Status: RESOLVED | Noted: 2023-07-29 | Resolved: 2023-08-03

## 2023-08-03 PROBLEM — T83.511A CATHETER-ASSOCIATED URINARY TRACT INFECTION: Status: ACTIVE | Noted: 2023-08-03

## 2023-08-03 PROBLEM — R79.89 ELEVATED TROPONIN I LEVEL: Status: RESOLVED | Noted: 2023-07-29 | Resolved: 2023-08-03

## 2023-08-03 LAB
ALBUMIN SERPL BCP-MCNC: 2 G/DL (ref 3.5–5)
ALBUMIN/GLOB SERPL: 0.5 {RATIO}
ALP SERPL-CCNC: 55 U/L (ref 45–115)
ALT SERPL W P-5'-P-CCNC: 270 U/L (ref 16–61)
ANION GAP SERPL CALCULATED.3IONS-SCNC: 11 MMOL/L (ref 7–16)
AST SERPL W P-5'-P-CCNC: 371 U/L (ref 15–37)
BASOPHILS # BLD AUTO: 0.09 K/UL (ref 0–0.2)
BASOPHILS NFR BLD AUTO: 0.6 % (ref 0–1)
BILIRUB SERPL-MCNC: 0.8 MG/DL (ref ?–1.2)
BUN SERPL-MCNC: 17 MG/DL (ref 7–18)
BUN/CREAT SERPL: 24 (ref 6–20)
CALCIUM SERPL-MCNC: 8.1 MG/DL (ref 8.5–10.1)
CHLORIDE SERPL-SCNC: 95 MMOL/L (ref 98–107)
CK SERPL-CCNC: 6128 U/L (ref 39–308)
CO2 SERPL-SCNC: 28 MMOL/L (ref 21–32)
CREAT SERPL-MCNC: 0.72 MG/DL (ref 0.7–1.3)
DIFFERENTIAL METHOD BLD: ABNORMAL
EGFR (NO RACE VARIABLE) (RUSH/TITUS): 119 ML/MIN/1.73M2
EOSINOPHIL # BLD AUTO: 0.14 K/UL (ref 0–0.5)
EOSINOPHIL NFR BLD AUTO: 1 % (ref 1–4)
ERYTHROCYTE [DISTWIDTH] IN BLOOD BY AUTOMATED COUNT: 11.9 % (ref 11.5–14.5)
GLOBULIN SER-MCNC: 3.8 G/DL (ref 2–4)
GLUCOSE SERPL-MCNC: 118 MG/DL (ref 74–106)
HCT VFR BLD AUTO: 37.5 % (ref 40–54)
HGB BLD-MCNC: 13.2 G/DL (ref 13.5–18)
IMM GRANULOCYTES # BLD AUTO: 0.76 K/UL (ref 0–0.04)
IMM GRANULOCYTES NFR BLD: 5.3 % (ref 0–0.4)
LYMPHOCYTES # BLD AUTO: 1.28 K/UL (ref 1–4.8)
LYMPHOCYTES NFR BLD AUTO: 8.9 % (ref 27–41)
LYMPHOCYTES NFR BLD MANUAL: 9 % (ref 27–41)
MCH RBC QN AUTO: 31.7 PG (ref 27–31)
MCHC RBC AUTO-ENTMCNC: 35.2 G/DL (ref 32–36)
MCV RBC AUTO: 89.9 FL (ref 80–96)
MONOCYTES # BLD AUTO: 1.36 K/UL (ref 0–0.8)
MONOCYTES NFR BLD AUTO: 9.5 % (ref 2–6)
MONOCYTES NFR BLD MANUAL: 10 % (ref 2–6)
MPC BLD CALC-MCNC: 9.8 FL (ref 9.4–12.4)
NEUTROPHILS # BLD AUTO: 10.68 K/UL (ref 1.8–7.7)
NEUTROPHILS NFR BLD AUTO: 74.7 % (ref 53–65)
NEUTS SEG NFR BLD MANUAL: 81 % (ref 50–62)
NRBC # BLD AUTO: 0 X10E3/UL
NRBC, AUTO (.00): 0 %
PLATELET # BLD AUTO: 233 K/UL (ref 150–400)
PLATELET MORPHOLOGY: ABNORMAL
POLYCHROMASIA BLD QL SMEAR: ABNORMAL
POTASSIUM SERPL-SCNC: 3.7 MMOL/L (ref 3.5–5.1)
PROT SERPL-MCNC: 5.8 G/DL (ref 6.4–8.2)
RBC # BLD AUTO: 4.17 M/UL (ref 4.6–6.2)
SODIUM SERPL-SCNC: 130 MMOL/L (ref 136–145)
WBC # BLD AUTO: 14.31 K/UL (ref 4.5–11)

## 2023-08-03 PROCEDURE — 63600175 PHARM REV CODE 636 W HCPCS

## 2023-08-03 PROCEDURE — 85025 COMPLETE CBC W/AUTO DIFF WBC: CPT

## 2023-08-03 PROCEDURE — 82550 ASSAY OF CK (CPK): CPT | Performed by: STUDENT IN AN ORGANIZED HEALTH CARE EDUCATION/TRAINING PROGRAM

## 2023-08-03 PROCEDURE — 25000003 PHARM REV CODE 250: Performed by: STUDENT IN AN ORGANIZED HEALTH CARE EDUCATION/TRAINING PROGRAM

## 2023-08-03 PROCEDURE — 51702 INSERT TEMP BLADDER CATH: CPT

## 2023-08-03 PROCEDURE — 80053 COMPREHEN METABOLIC PANEL: CPT

## 2023-08-03 PROCEDURE — 99239 HOSP IP/OBS DSCHRG MGMT >30: CPT | Mod: GC,,, | Performed by: STUDENT IN AN ORGANIZED HEALTH CARE EDUCATION/TRAINING PROGRAM

## 2023-08-03 PROCEDURE — 25000003 PHARM REV CODE 250

## 2023-08-03 PROCEDURE — 99239 PR HOSPITAL DISCHARGE DAY,>30 MIN: ICD-10-PCS | Mod: GC,,, | Performed by: STUDENT IN AN ORGANIZED HEALTH CARE EDUCATION/TRAINING PROGRAM

## 2023-08-03 RX ORDER — OXYCODONE HYDROCHLORIDE 5 MG/1
5 TABLET ORAL EVERY 8 HOURS
Status: DISCONTINUED | OUTPATIENT
Start: 2023-08-03 | End: 2023-08-03 | Stop reason: HOSPADM

## 2023-08-03 RX ORDER — CIPROFLOXACIN 500 MG/1
500 TABLET ORAL EVERY 12 HOURS
Start: 2023-08-03 | End: 2023-08-10

## 2023-08-03 RX ADMIN — LORAZEPAM 1 MG: 1 TABLET ORAL at 12:08

## 2023-08-03 RX ADMIN — ENOXAPARIN SODIUM 40 MG: 100 INJECTION SUBCUTANEOUS at 04:08

## 2023-08-03 RX ADMIN — OXYCODONE HYDROCHLORIDE 5 MG: 5 TABLET ORAL at 12:08

## 2023-08-03 RX ADMIN — OXYCODONE HYDROCHLORIDE 5 MG: 5 TABLET ORAL at 02:08

## 2023-08-03 RX ADMIN — LORAZEPAM 1 MG: 1 TABLET ORAL at 08:08

## 2023-08-03 RX ADMIN — LORAZEPAM 1 MG: 1 TABLET ORAL at 04:08

## 2023-08-03 RX ADMIN — TAMSULOSIN HYDROCHLORIDE 0.4 MG: 0.4 CAPSULE ORAL at 08:08

## 2023-08-03 RX ADMIN — BACITRACIN ZINC AND POLYMYXIN B SULFATE: at 08:08

## 2023-08-03 NOTE — NURSING
Voiding Trial Information:    Patient awake and alert. Patient shown how to remove 16 fr william and drainage system. Patient voiced understanding that he is to remove william catheter at 5 am on 8/10/2023. Pt is to come to the clinic for an appointment with KELLEY Steven at 1:30. If pt becomes uncomfortable after removing william catheter. Pt can come to clinic earlier that day. My phone number was given to patient if he has any questions.

## 2023-08-03 NOTE — PLAN OF CARE
[facility]  [facility] Final Note    Primary Care Provider: No, Primary Doctor    Expected Discharge Date: 8/3/2023    Final Discharge Note (most recent)       Final Note - 08/03/23 1339          Final Note    Assessment Type Final Discharge Note     Anticipated Discharge Disposition Home or Self Care        Post-Acute Status    Discharge Delays None known at this time                   Patient discharging home today.

## 2023-08-03 NOTE — NURSING
While attempting to apply ointment to left thigh, pt repositioned for this nurse to get better access, pt began screaming and yelling very loudly, c/o of severe 10/10 pain all over, Dr. Thompson notified, new order noted

## 2023-08-03 NOTE — PLAN OF CARE
Problem: Adult Inpatient Plan of Care  Goal: Plan of Care Review  Outcome: Ongoing, Progressing  Goal: Patient-Specific Goal (Individualized)  Outcome: Ongoing, Progressing  Goal: Absence of Hospital-Acquired Illness or Injury  Outcome: Ongoing, Progressing  Goal: Optimal Comfort and Wellbeing  Outcome: Ongoing, Progressing  Goal: Readiness for Transition of Care  Outcome: Ongoing, Progressing     Problem: Fluid and Electrolyte Imbalance (Acute Kidney Injury/Impairment)  Goal: Fluid and Electrolyte Balance  Outcome: Ongoing, Progressing     Problem: Oral Intake Inadequate (Acute Kidney Injury/Impairment)  Goal: Optimal Nutrition Intake  Outcome: Ongoing, Progressing     Problem: Renal Function Impairment (Acute Kidney Injury/Impairment)  Goal: Effective Renal Function  Outcome: Ongoing, Progressing     Problem: Skin Injury Risk Increased  Goal: Skin Health and Integrity  Outcome: Ongoing, Progressing     Problem: Infection  Goal: Absence of Infection Signs and Symptoms  Outcome: Ongoing, Progressing     Problem: Fall Injury Risk  Goal: Absence of Fall and Fall-Related Injury  Outcome: Ongoing, Progressing

## 2023-08-03 NOTE — ASSESSMENT & PLAN NOTE
08/03/2023  - No concerns at this time  -Will follow up with Dr. Chandler for urine retention  - Discharged today     08/01/2023  - Rhabdomyolysis is improving  -Decreased urine output; ordered bedside bladder scan; results 509 mL   - Ordered a straight cath  -Flomax 0.8mg       07/31/2023  - Repeated CPK and Myoglobin; 24,240 and 5,787 respectively; trending  in the right direction  -Continue IVF resuscitation;  mL/Hr  -Continue urine output

## 2023-08-03 NOTE — ASSESSMENT & PLAN NOTE
08/03/2023  - WBC was elevated at 14.31  -Send home with PO Cipo 500 mg q12h for 7 days   - Discharged today with a william; following up with Dr. Chandler in a week for a urinary retention

## 2023-08-03 NOTE — DISCHARGE SUMMARY
[facility] - [address] [place] [address] Medicine  Discharge Summary      Patient Name: Raj Watkins  MRN: [id number]  ROWDY: [id number]  Patient Class: IP- Inpatient  Admission Date: 7/29/2023  Hospital Length of Stay: 5 days  Discharge Date and Time:  08/03/2023 1:09 PM  Attending Physician: Adan Preciado DO   Discharging Provider: aSl Carbajal MD  Primary Care Provider: Thu Primary Doctor    Primary Care Team: Networked reference to record PCT     HPI:   Patient is a 38 year old male with a past medical history of diabetes mellitus, low back pain, and hypertension who is a direct admit of [facility]. Patient presents with bilateral hip pain following a fall. He states that last night he starting thing about an ongoing family issue and started drinking. He also endorsed using methamphetamines. Of note, patient has a history of IVDU specifically methamphetamine, as well as, alcohol use. He states he has been drinking about 7-9 beers per night. Last night, after drinking and using methamphetamines he got up to go to the restroom and fell into the tub. Denies any head trauma. Patient estimates he was unconscious throughout the night (approximately 4 + hours) in a hot bathroom. When he awoke his legs were crossed and hanging over the edge of the tub. Upon attempting to sit up on the edge of the tube he noted severe bilateral hip pain. EMS was alerted and he was transport to [facility] emergency department.     In the ED at [facility], his vital were /81, T 36.4 C. Labs were significant for WBC 47959, , CK > 1000, myoglobin > 1000. Patient was given 3L NS, magnesium sulfate 4g IV, and cath.     In the ambulance, patient put out 1700 ml of dark brown urine, was given Fentanyl 100, and Zofran 4mg. Patient was admitted directly to the hospialist service at [facility]           * No surgery found *      Hospital Course:   Patient is a 38 year old male  with a past medical history of diabetes mellitus, low back pain, and hypertension who is a direct admit of [facility]. Patient presents with bilateral hip pain following a fall. He states that last night he starting thing about an ongoing family issue and started drinking. He also endorsed using methamphetamines. Of note, patient has a history of IVDU specifically methamphetamine, as well as, alcohol use.   In the ED at [facility], his vital were /81, T 36.4 C. Labs were significant for WBC 79154, , CK > 1000, myoglobin > 1000. Patient was given 3L NS, magnesium sulfate 4g IV, and cath. Patient complications has resolved enough to be discharged. Patient will follow-up with Dr. Chandler(urologist) outpatient in a week to remove william cathter and further work-up for urinary retention.       08/03/2023  Patient was seen and examined at bedside. No major concerns today. Vital unremarkable. LFT's are trending in the right direction. Urine output of 400 cc this morning. Denies angina, dyspnea, headache or abdominal discomfort    08/02/2023  Patient was seen and examined at bedside. No acute events overnight. Patients reports bilateral hip tenderness and groin tingling. Noted on physical examine right-side hip blisters with  tenderness with pressure. Right leg swelling; ultrasound doppler for DVT was negative. Urine output 200cc with william. Vitals are unremarkable. Leukocytosis has resolved 10.56; sodium 132; ;. Denies angina , dyspnea, headache, or abdominal discomfort.      07/31/2023: Raj Knutsno was seen and examined at bedside. Patient is A&O X2( name and year); He reports weakness, low appetite, and body tenderness. Patient urine output today 1200cc. CIWA 4( mild nausea, mild anxious, and mild itching).  Patient denies chest pain, SOB, visual, auditory, or tactile hallucinations.        Goals of Care Treatment Preferences:  Code Status: Full Code      Consults:      Renal/  Catheter-associated urinary tract infection  08/03/2023  - WBC was elevated at 14.31  -Send home with PO Cipo 500 mg q12h for 7 days   - Discharged today with a william; following up with Dr. Chandler in a week for a urinary retention       Orthopedic  * Rhabdomyolysis  08/03/2023  - No concerns at this time  -Will follow up with Dr. Chandler for urine retention  - Discharged today     08/01/2023  - Rhabdomyolysis is improving  -Decreased urine output; ordered bedside bladder scan; results 509 mL   - Ordered a straight cath  -Flomax 0.8mg       07/31/2023  - Repeated CPK and Myoglobin; 24,240 and 5,787 respectively; trending  in the right direction  -Continue IVF resuscitation;  mL/Hr  -Continue urine output      Bilateral hip pain        08/02/2023  -Seen by PT with improvement; noticed right LE blisters/wound. Started on bacitracin ointment     08/01/2023  -Seen by PT; recommended rehabilitation to regain independent function; We appreciate your recommendations  -Lumbar MRI showed degenerative disc disease and L3-4 stenosis  will continue to treat chronic pain           7/31/2023  -Patient reports hip tenderness and weakness  - Ordered PT for evaluate and treat   - Will continue to monitor       7/30  Patient reportedly fell at home in Bathtub for unknown amount of time  Back and Hip pain along with rhabdomyolysis  CT Cervical, Thoracic and Pelvis without contrast - negative for any acute process.   Lumbar CT - Multilevel degenerative change of the lumbar spine.         Final Active Diagnoses:    Diagnosis Date Noted POA    PRINCIPAL PROBLEM:  Rhabdomyolysis [M62.82] 07/31/2023 Yes    Bilateral hip pain [M25.551, M25.552] 07/29/2023 Yes    Catheter-associated urinary tract infection [T83.511A, N39.0] 08/03/2023 Unknown    Transaminitis [R74.01] 07/29/2023 Yes    Uncomplicated alcohol dependence [F10.20] 07/29/2023 Yes      Problems Resolved During this Admission:    Diagnosis Date Noted Date  Resolved POA    Primary hypertension [I10] 07/29/2023 08/03/2023 Yes    YANNICK (acute kidney injury) [N17.9] 07/29/2023 08/03/2023 Yes    Elevated troponin I level [R77.8] 07/29/2023 08/03/2023 Yes       Discharged Condition: fair    Disposition: Home or Self Care    Follow Up:    Patient Instructions:      Ambulatory referral/consult to Urology   Standing Status: Future   Referral Priority: Routine Referral Type: Consultation   Referral Reason: Specialty Services Required   Referred to Provider: EDILBERTO QUINN JR Requested Specialty: Urology   Number of Visits Requested: 1     Diet Adult Regular       Significant Diagnostic Studies: Labs: All labs within the past 24 hours have been reviewed    Pending Diagnostic Studies:     Procedure Component Value Units Date/Time    EXTRA TUBES [[id number]] Collected: 07/31/23 1019    Order Status: Sent Lab Status: In process Updated: 07/31/23 1027    Specimen: Blood, Venous     Narrative:      The following orders were created for panel order EXTRA TUBES.  Procedure                               Abnormality         Status                     ---------                               -----------         ------                     Light Green Top Hold[[id number]]                             In process                   Please view results for these tests on the individual orders.         Medications:  Reconciled Home Medications:      Medication List      START taking these medications    ciprofloxacin HCl 500 MG tablet  Commonly known as: CIPRO  Take 1 tablet (500 mg total) by mouth every 12 (twelve) hours. for 7 days            Indwelling Lines/Drains at time of discharge:   Lines/Drains/Airways     Drain  Duration                Urethral Catheter 08/02/23 1600 16 Fr. <1 day                Time spent on the discharge of patient: 40 minutes         Sal Carbajal MD  Department of Hospital Medicine  [facility] - [facility] Tele[facility]

## 2023-08-04 ENCOUNTER — PATIENT OUTREACH (OUTPATIENT)
Dept: ADMINISTRATIVE | Facility: CLINIC | Age: 38
End: 2023-08-04

## 2023-08-04 NOTE — PROGRESS NOTES
C3 nurse attempted to contact Raj Watkins  for a TCC post hospital discharge follow up call. No answer. Left messge with call back information. The patient does not have a scheduled HOSFU appointment, and the pt does not have an [facility] PCP.

## 2023-08-07 NOTE — PROGRESS NOTES
Subjective     Patient ID: Raj Watkins is a 38 y.o. male.    Chief Complaint: No chief complaint on file.    This pleasant 38 year old male presents to clinic in a wheelchair with brother as a new patient referral from Dr. Sal Carbajal for voiding trial. The patient was admitted to [facility] on July 29, 2023 for Rhabdomyolysis, bilateral hip pain, uti, YANNICK with urinary retention. The patient was discharged home with william catheter on August 3, 2023. He was referred to Urology for a voiding trial. He was instructed to remove the william catheter at 5 am today and increase fluids. He was then to follow up at the Urology clinic. Patient states he removed william catheter on Monday August 7, 2023 and has been voiding ever since. He does report some urgency, frequency and nocturia 2 or 3 times a night. He is on Lasix and it is unclear how much of his voiding symptoms are related to this. He is on Cipro 500 mg and will take his last dose today. He denies dysuria, hematuria, incontinence, weak stream, straining, fever, chills, nausea or vomiting. His PVR today is 38 mls. His kidney function was WNL on August 3, 2023 with a BUN of 17 and creatinine of 0.72. He does report bilateral hip pain and feet pain. He will follow up with his PCP for the Rhabdomyolysis, pain and other complaints. We will consider a urological work up if patient has another episode of urinary retention. I discussed the plan in detail with Urologist Dr. VEENA Chandler, the patient and brother and they are in agreement with the plan. All their questions were answered at today's visit. I spent 30 minutes counseling this patient. ------------------------------------------------------------------------------------------------------------------------- [August 10, 2023].         Review of Systems   Constitutional:  Negative for activity change and fever.   HENT:  Negative for hearing loss and trouble swallowing.    Eyes:  Negative for visual  disturbance.   Respiratory:  Negative for cough, shortness of breath and wheezing.    Cardiovascular:  Negative for chest pain.   Gastrointestinal:  Negative for abdominal pain, diarrhea, nausea and vomiting.   Endocrine: Negative for polyuria.   Genitourinary:  Negative for bladder incontinence, decreased urine volume, difficulty urinating, discharge, dysuria, enuresis, erectile dysfunction, flank pain, frequency, genital sores, hematuria, penile pain, penile swelling, scrotal swelling, testicular pain and urgency.        Urinary retention        Voiding trial    Musculoskeletal:  Negative for back pain and gait problem.   Integumentary:  Negative for rash.   Neurological:  Negative for speech difficulty and weakness.   Psychiatric/Behavioral:  Negative for behavioral problems and confusion.         Objective     Physical Exam  Vitals and nursing note reviewed.   Constitutional:       General: He is not in acute distress.     Appearance: Normal appearance. He is not ill-appearing, toxic-appearing or diaphoretic.   HENT:      Head: Normocephalic.   Eyes:      Extraocular Movements: Extraocular movements intact.   Cardiovascular:      Rate and Rhythm: Normal rate and regular rhythm.      Heart sounds: Normal heart sounds.   Pulmonary:      Effort: Pulmonary effort is normal. No respiratory distress.      Breath sounds: Normal breath sounds. No wheezing, rhonchi or rales.   Abdominal:      General: Bowel sounds are normal.      Palpations: Abdomen is soft.      Tenderness: There is no abdominal tenderness. There is no right CVA tenderness, left CVA tenderness, guarding or rebound.   Musculoskeletal:         General: Normal range of motion.      Cervical back: Normal range of motion. No rigidity.   Skin:     General: Skin is warm and dry.   Neurological:      General: No focal deficit present.      Mental Status: He is alert and oriented to person, place, and time.      Motor: No weakness.      Coordination:  Coordination normal.      Comments: Patient presents to clinic in a wheelchair   Psychiatric:         Mood and Affect: Mood normal.         Behavior: Behavior normal.         Thought Content: Thought content normal.        Assessment and Plan     Problem List Items Addressed This Visit          Renal/    Urinary retention - Primary       Orthopedic    Rhabdomyolysis     Other Visit Diagnoses       Oliguria        Frequency of urination        Relevant Orders    Urine culture    Urgency of urination        Relevant Orders    Urine culture             Urine culture will treat if indicated  Follow up with your primary care doctor for Rhabdomyolysis and pain management   Will consider BRIGHT and Cystoscopy if patient has another episode of urinary retention  Follow up with Urology as needed for voiding symptoms

## 2023-08-10 ENCOUNTER — OFFICE VISIT (OUTPATIENT)
Dept: UROLOGY | Facility: CLINIC | Age: 38
End: 2023-08-10

## 2023-08-10 VITALS
HEIGHT: 76 IN | WEIGHT: 280 LBS | DIASTOLIC BLOOD PRESSURE: 68 MMHG | RESPIRATION RATE: 18 BRPM | TEMPERATURE: 99 F | BODY MASS INDEX: 34.1 KG/M2 | OXYGEN SATURATION: 93 % | HEART RATE: 83 BPM | SYSTOLIC BLOOD PRESSURE: 102 MMHG

## 2023-08-10 DIAGNOSIS — R35.0 FREQUENCY OF URINATION: ICD-10-CM

## 2023-08-10 DIAGNOSIS — R33.9 URINARY RETENTION: Primary | ICD-10-CM

## 2023-08-10 DIAGNOSIS — R34 OLIGURIA: ICD-10-CM

## 2023-08-10 DIAGNOSIS — R39.15 URGENCY OF URINATION: ICD-10-CM

## 2023-08-10 DIAGNOSIS — M62.82 NON-TRAUMATIC RHABDOMYOLYSIS: ICD-10-CM

## 2023-08-10 PROBLEM — F17.200 CURRENT SMOKER: Status: ACTIVE | Noted: 2018-06-25

## 2023-08-10 PROCEDURE — 99203 PR OFFICE/OUTPT VISIT, NEW, LEVL III, 30-44 MIN: ICD-10-PCS | Mod: S$PBB,,, | Performed by: NURSE PRACTITIONER

## 2023-08-10 PROCEDURE — 87086 URINE CULTURE/COLONY COUNT: CPT | Mod: ,,, | Performed by: CLINICAL MEDICAL LABORATORY

## 2023-08-10 PROCEDURE — 99215 OFFICE O/P EST HI 40 MIN: CPT | Mod: PBBFAC | Performed by: NURSE PRACTITIONER

## 2023-08-10 PROCEDURE — 87086 CULTURE, URINE: ICD-10-PCS | Mod: ,,, | Performed by: CLINICAL MEDICAL LABORATORY

## 2023-08-10 PROCEDURE — 99203 OFFICE O/P NEW LOW 30 MIN: CPT | Mod: S$PBB,,, | Performed by: NURSE PRACTITIONER

## 2023-08-10 RX ORDER — LISINOPRIL AND HYDROCHLOROTHIAZIDE 20; 25 MG/1; MG/1
1 TABLET ORAL
COMMUNITY
Start: 2023-06-10

## 2023-08-10 RX ORDER — SERTRALINE HYDROCHLORIDE 50 MG/1
50 TABLET, FILM COATED ORAL
COMMUNITY
Start: 2023-07-27

## 2023-08-10 RX ORDER — CLONAZEPAM 2 MG/1
2 TABLET ORAL 2 TIMES DAILY
COMMUNITY
Start: 2023-07-27

## 2023-08-10 RX ORDER — FUROSEMIDE 20 MG/1
20 TABLET ORAL
COMMUNITY
Start: 2023-08-06

## 2023-08-10 NOTE — PATIENT INSTRUCTIONS
Urine culture will treat if indicated  Follow up with your primary care doctor for Rhabdomyolysis and pain management   Will consider BRIGHT and Cystoscopy if patient has another episode of urinary retention  Follow up with Urology as needed for voiding symptoms

## 2023-08-12 LAB — UA COMPLETE W REFLEX CULTURE PNL UR: NO GROWTH

## 2023-08-14 ENCOUNTER — TELEPHONE (OUTPATIENT)
Dept: UROLOGY | Facility: CLINIC | Age: 38
End: 2023-08-14

## 2023-08-14 NOTE — TELEPHONE ENCOUNTER
----- Message from Waldo Yusuf NP sent at 8/13/2023  8:31 PM CDT -----  Please let patient know that his urine culture was negative, thanks   I called pt and left voice message.  Will call later.

## 2023-08-15 ENCOUNTER — TELEPHONE (OUTPATIENT)
Dept: UROLOGY | Facility: CLINIC | Age: 38
End: 2023-08-15

## 2023-08-15 NOTE — TELEPHONE ENCOUNTER
----- Message from Waldo Yusuf NP sent at 8/13/2023  8:31 PM CDT -----  Please let patient know that his urine culture was negative, thanks   I called pt at home number and got him.  I relayed the above message to him.  I also asked him about the [phone] and he said he does not have that number anymore.  I asked if he wants to list a name/number for an emergency  and he gave me his mother Deirdre De La Cruz at [phone].  I will get  to change that number.  He voiced understanding to the message above.

## 2023-08-15 NOTE — TELEPHONE ENCOUNTER
----- Message from Waldo Yusuf NP sent at 8/13/2023  8:31 PM CDT -----  Please let patient know that his urine culture was negative, thanks. I  called pt at [phone] and a female answered and said it is the wrong number.

## 2023-08-21 ENCOUNTER — HOSPITAL ENCOUNTER (EMERGENCY)
Facility: HOSPITAL | Age: 38
Discharge: HOME | End: 2023-08-22
Attending: EMERGENCY MEDICINE

## 2023-08-21 DIAGNOSIS — M79.89 PAIN AND SWELLING OF LOWER LEG, RIGHT: ICD-10-CM

## 2023-08-21 DIAGNOSIS — G90.521 COMPLEX REGIONAL PAIN SYNDROME TYPE 1 OF RIGHT LOWER EXTREMITY: Primary | ICD-10-CM

## 2023-08-21 DIAGNOSIS — M79.661 PAIN AND SWELLING OF LOWER LEG, RIGHT: ICD-10-CM

## 2023-08-21 LAB
ALBUMIN SERPL BCP-MCNC: 3.6 G/DL (ref 3.5–5)
ALBUMIN/GLOB SERPL: 1 {RATIO}
ALP SERPL-CCNC: 91 U/L (ref 45–115)
ALT SERPL W P-5'-P-CCNC: 40 U/L (ref 16–61)
ANION GAP SERPL CALCULATED.3IONS-SCNC: 11 MMOL/L (ref 7–16)
AST SERPL W P-5'-P-CCNC: 37 U/L (ref 15–37)
BILIRUB SERPL-MCNC: 0.6 MG/DL (ref ?–1.2)
BUN SERPL-MCNC: 11 MG/DL (ref 7–18)
BUN/CREAT SERPL: 13 (ref 6–20)
CALCIUM SERPL-MCNC: 8.6 MG/DL (ref 8.5–10.1)
CHLORIDE SERPL-SCNC: 101 MMOL/L (ref 98–107)
CK SERPL-CCNC: 507 U/L (ref 39–308)
CO2 SERPL-SCNC: 27 MMOL/L (ref 21–32)
CREAT SERPL-MCNC: 0.87 MG/DL (ref 0.7–1.3)
EGFR (NO RACE VARIABLE) (RUSH/TITUS): 113 ML/MIN/1.73M2
GLOBULIN SER-MCNC: 3.5 G/DL (ref 2–4)
GLUCOSE SERPL-MCNC: 110 MG/DL (ref 74–106)
MAGNESIUM SERPL-MCNC: 2.1 MG/DL (ref 1.7–2.3)
POTASSIUM SERPL-SCNC: 3.8 MMOL/L (ref 3.5–5.1)
PROT SERPL-MCNC: 7.1 G/DL (ref 6.4–8.2)
SODIUM SERPL-SCNC: 135 MMOL/L (ref 136–145)

## 2023-08-21 PROCEDURE — 96374 THER/PROPH/DIAG INJ IV PUSH: CPT

## 2023-08-21 PROCEDURE — 80053 COMPREHEN METABOLIC PANEL: CPT | Performed by: EMERGENCY MEDICINE

## 2023-08-21 PROCEDURE — 83735 ASSAY OF MAGNESIUM: CPT | Performed by: EMERGENCY MEDICINE

## 2023-08-21 PROCEDURE — 99285 EMERGENCY DEPT VISIT HI MDM: CPT | Mod: ,,, | Performed by: EMERGENCY MEDICINE

## 2023-08-21 PROCEDURE — 96361 HYDRATE IV INFUSION ADD-ON: CPT

## 2023-08-21 PROCEDURE — 99285 PR EMERGENCY DEPT VISIT,LEVEL V: ICD-10-PCS | Mod: ,,, | Performed by: EMERGENCY MEDICINE

## 2023-08-21 PROCEDURE — 99285 EMERGENCY DEPT VISIT HI MDM: CPT | Mod: 25

## 2023-08-21 PROCEDURE — 63600175 PHARM REV CODE 636 W HCPCS: Performed by: EMERGENCY MEDICINE

## 2023-08-21 PROCEDURE — 85025 COMPLETE CBC W/AUTO DIFF WBC: CPT | Performed by: EMERGENCY MEDICINE

## 2023-08-21 PROCEDURE — 82550 ASSAY OF CK (CPK): CPT | Performed by: EMERGENCY MEDICINE

## 2023-08-21 RX ORDER — ORPHENADRINE CITRATE 30 MG/ML
60 INJECTION INTRAMUSCULAR; INTRAVENOUS
Status: COMPLETED | OUTPATIENT
Start: 2023-08-21 | End: 2023-08-21

## 2023-08-21 RX ORDER — KETOROLAC TROMETHAMINE 30 MG/ML
15 INJECTION, SOLUTION INTRAMUSCULAR; INTRAVENOUS
Status: DISCONTINUED | OUTPATIENT
Start: 2023-08-21 | End: 2023-08-22

## 2023-08-21 RX ADMIN — SODIUM CHLORIDE, POTASSIUM CHLORIDE, SODIUM LACTATE AND CALCIUM CHLORIDE 1000 ML: 600; 310; 30; 20 INJECTION, SOLUTION INTRAVENOUS at 10:08

## 2023-08-21 RX ADMIN — ORPHENADRINE CITRATE 60 MG: 60 INJECTION INTRAMUSCULAR; INTRAVENOUS at 10:08

## 2023-08-22 VITALS
SYSTOLIC BLOOD PRESSURE: 138 MMHG | HEART RATE: 70 BPM | RESPIRATION RATE: 20 BRPM | BODY MASS INDEX: 34.08 KG/M2 | DIASTOLIC BLOOD PRESSURE: 87 MMHG | WEIGHT: 280 LBS | OXYGEN SATURATION: 96 % | TEMPERATURE: 98 F

## 2023-08-22 LAB
BASOPHILS # BLD AUTO: 0.04 K/UL (ref 0–0.2)
BASOPHILS NFR BLD AUTO: 0.6 % (ref 0–1)
BILIRUB UR QL STRIP: NEGATIVE
CLARITY UR: CLEAR
COLOR UR: NORMAL
DIFFERENTIAL METHOD BLD: ABNORMAL
EOSINOPHIL # BLD AUTO: 0.14 K/UL (ref 0–0.5)
EOSINOPHIL NFR BLD AUTO: 1.9 % (ref 1–4)
ERYTHROCYTE [DISTWIDTH] IN BLOOD BY AUTOMATED COUNT: 12.8 % (ref 11.5–14.5)
GLUCOSE UR STRIP-MCNC: NORMAL MG/DL
HCT VFR BLD AUTO: 38 % (ref 40–54)
HGB BLD-MCNC: 13.1 G/DL (ref 13.5–18)
HOLD SPECIMEN: NORMAL
IMM GRANULOCYTES # BLD AUTO: 0.08 K/UL (ref 0–0.04)
IMM GRANULOCYTES NFR BLD: 1.1 % (ref 0–0.4)
KETONES UR STRIP-SCNC: NEGATIVE MG/DL
LEUKOCYTE ESTERASE UR QL STRIP: NEGATIVE
LYMPHOCYTES # BLD AUTO: 2.18 K/UL (ref 1–4.8)
LYMPHOCYTES NFR BLD AUTO: 30.2 % (ref 27–41)
MCH RBC QN AUTO: 31.5 PG (ref 27–31)
MCHC RBC AUTO-ENTMCNC: 34.5 G/DL (ref 32–36)
MCV RBC AUTO: 91.3 FL (ref 80–96)
MONOCYTES # BLD AUTO: 0.99 K/UL (ref 0–0.8)
MONOCYTES NFR BLD AUTO: 13.7 % (ref 2–6)
MPC BLD CALC-MCNC: 10.1 FL (ref 9.4–12.4)
NEUTROPHILS # BLD AUTO: 3.78 K/UL (ref 1.8–7.7)
NEUTROPHILS NFR BLD AUTO: 52.5 % (ref 53–65)
NITRITE UR QL STRIP: NEGATIVE
NRBC # BLD AUTO: 0 X10E3/UL
NRBC, AUTO (.00): 0 %
PH UR STRIP: 6.5 PH UNITS
PLATELET # BLD AUTO: 253 K/UL (ref 150–400)
PROT UR QL STRIP: NEGATIVE
RBC # BLD AUTO: 4.16 M/UL (ref 4.6–6.2)
RBC # UR STRIP: NEGATIVE /UL
SP GR UR STRIP: 1.01
UROBILINOGEN UR STRIP-ACNC: NORMAL MG/DL
WBC # BLD AUTO: 7.21 K/UL (ref 4.5–11)

## 2023-08-22 PROCEDURE — 63600175 PHARM REV CODE 636 W HCPCS: Performed by: EMERGENCY MEDICINE

## 2023-08-22 PROCEDURE — 96375 TX/PRO/DX INJ NEW DRUG ADDON: CPT

## 2023-08-22 PROCEDURE — 81003 URINALYSIS AUTO W/O SCOPE: CPT | Performed by: EMERGENCY MEDICINE

## 2023-08-22 PROCEDURE — 25000003 PHARM REV CODE 250: Performed by: EMERGENCY MEDICINE

## 2023-08-22 RX ORDER — HYDROCODONE BITARTRATE AND ACETAMINOPHEN 5; 325 MG/1; MG/1
1 TABLET ORAL EVERY 6 HOURS PRN
Qty: 12 TABLET | Refills: 0 | Status: SHIPPED | OUTPATIENT
Start: 2023-08-22

## 2023-08-22 RX ORDER — KETOROLAC TROMETHAMINE 15 MG/ML
15 INJECTION, SOLUTION INTRAMUSCULAR; INTRAVENOUS
Status: COMPLETED | OUTPATIENT
Start: 2023-08-22 | End: 2023-08-22

## 2023-08-22 RX ORDER — PREGABALIN 75 MG/1
75 CAPSULE ORAL 2 TIMES DAILY
Qty: 60 CAPSULE | Refills: 6 | Status: SHIPPED | OUTPATIENT
Start: 2023-08-22 | End: 2024-02-20

## 2023-08-22 RX ORDER — OXYCODONE AND ACETAMINOPHEN 5; 325 MG/1; MG/1
1 TABLET ORAL
Status: COMPLETED | OUTPATIENT
Start: 2023-08-22 | End: 2023-08-22

## 2023-08-22 RX ORDER — MORPHINE SULFATE 4 MG/ML
4 INJECTION, SOLUTION INTRAMUSCULAR; INTRAVENOUS
Status: COMPLETED | OUTPATIENT
Start: 2023-08-22 | End: 2023-08-22

## 2023-08-22 RX ADMIN — OXYCODONE HYDROCHLORIDE AND ACETAMINOPHEN 1 TABLET: 5; 325 TABLET ORAL at 01:08

## 2023-08-22 RX ADMIN — MORPHINE SULFATE 4 MG: 4 INJECTION, SOLUTION INTRAMUSCULAR; INTRAVENOUS at 01:08

## 2023-08-22 RX ADMIN — KETOROLAC TROMETHAMINE 15 MG: 15 INJECTION, SOLUTION INTRAMUSCULAR; INTRAVENOUS at 12:08

## 2023-08-22 NOTE — ED PROVIDER NOTES
Encounter Date: 8/21/2023    SCRIBE #1 NOTE: I, Treva Monge, am scribing for, and in the presence of,  William Bradford MD. I have scribed the entire note.       History     Chief Complaint   Patient presents with    Generalized Body Aches     Ems from home - c/o pain all over - recent d/c from here for kendal     This is a 37 y/o white male,who presents to the ED for evaluation. He states he was seen here on the 29th and after a fall. He states he fell and there was a 5 hour amount of LOC and was DX with rhabdomyolysis. He states now his hips, and legs are hurting. He notes the right leg is worse than the left. There is no hx of CP, neck pain, or abdominal pain. There is no hx of fever or vomiting. There are no other complains/pain in the ED at this time.     The history is provided by the patient and the EMS personnel. No  was used.     Review of patient's allergies indicates:  No Known Allergies  No past medical history on file.  No past surgical history on file.  No family history on file.  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   Musculoskeletal:  Negative for neck pain.        Bilateral hip pain  Bilateral leg pain worse to the left.      All other systems reviewed and are negative.      Physical Exam     Initial Vitals [08/21/23 2205]   BP Pulse Resp Temp SpO2   129/89 89 16 98.3 °F (36.8 °C) 98 %      MAP       --         Physical Exam    Nursing note and vitals reviewed.  Constitutional: He appears well-developed and well-nourished.   HENT:   Head: Normocephalic and atraumatic.   Eyes: EOM are normal. Pupils are equal, round, and reactive to light.   Neck: Neck supple. No thyromegaly present.    Normal range of motion.  Cardiovascular:  Normal rate, regular rhythm, normal heart sounds and intact distal pulses.           No murmur heard.  Pulmonary/Chest: Breath sounds normal. No respiratory distress. He has no wheezes.   Abdominal: Abdomen is soft. Bowel sounds are normal. There is no abdominal tenderness.   Musculoskeletal:         General: Tenderness (Right leg tenderness.) present. No edema. Normal range of motion.      Cervical back: Normal range of motion and neck supple.     Lymphadenopathy:     He has no cervical adenopathy.   Neurological: He is alert and oriented to person, place, and time. He has normal strength and normal reflexes. No cranial nerve deficit or sensory deficit. GCS score is 15. GCS eye subscore is 4. GCS verbal subscore is 5. GCS motor subscore is 6.   Skin: Skin is warm and dry. Capillary refill takes less than 2 seconds. No rash noted.   Psychiatric: He has a normal mood and affect.         ED Course   Procedures  Labs Reviewed   COMPREHENSIVE METABOLIC PANEL - Abnormal; Notable for the following components:       Result Value    Sodium 135 (*)     Glucose 110 (*)     All other components within normal limits   CK - Abnormal; Notable for the following components:     (*)     All other components within normal limits   CBC WITH DIFFERENTIAL - Abnormal; Notable for the following components:    RBC 4.16 (*)     Hemoglobin 13.1 (*)     Hematocrit 38.0 (*)     MCH 31.5 (*)     Neutrophils % 52.5 (*)     Monocytes % 13.7 (*)     Immature Granulocytes % 1.1 (*)     Monocytes, Absolute 0.99 (*)     Immature Granulocytes, Absolute 0.08 (*)     All other components within normal limits   MAGNESIUM - Normal   GREY TOP HOLD   CBC W/ AUTO DIFFERENTIAL    Narrative:     The following orders were created for panel order CBC auto differential.  Procedure                               Abnormality         Status                     ---------                               -----------          ------                     CBC with Differential[[id number]]        Abnormal            Final result                 Please view results for these tests on the individual orders.   URINALYSIS, REFLEX TO URINE CULTURE   EXTRA TUBES    Narrative:     The following orders were created for panel order EXTRA TUBES.  Procedure                               Abnormality         Status                     ---------                               -----------         ------                     Light Blue Top Hold[[id number]]                              In process                 Light Green Top Hold[[id number]]                             In process                 Lavender Top Hold[[id number]]                                In process                 Gold Top Hold[[id number]]                                    In process                 Glaser Top Hold[[id number]]                                    Final result                 Please view results for these tests on the individual orders.   LIGHT BLUE TOP HOLD   LIGHT GREEN TOP HOLD   LAVENDER TOP HOLD   GOLD TOP HOLD          Imaging Results              US Lower Extremity Arteries Right (Final result)  Result time 08/22/23 07:45:47      Final result by Earl Keenan DO (08/22/23 07:45:47)                   Impression:      No high-grade stenosis or focal occlusion.      Electronically signed by: Earl Keenan  Date:    08/22/2023  Time:    07:45               Narrative:    EXAMINATION:  US LOWER EXTREMITY ARTERIES RIGHT    CLINICAL HISTORY:  Pain in right lower leg    TECHNIQUE:  GRAYSCALE, COLOR FLOW DOPPLER AND DOPPLER SPECTRAL ANALYSIS WAS USED FOR THE EVALUATION.    COMPARISON:  None    FINDINGS:  Triphasic waveforms of the arteries of the right lower extremity.  The velocities of the right lower extremity are normal.                                       US Lower Extremity Veins Right (Final result)  Result time 08/22/23 07:34:54      Final result by Fletcher Floyd  BRENT FORRESTER MD (08/22/23 07:34:54)                   Impression:      No evidence of deep venous thrombosis.    Ultrasound images stored and captured.      Electronically signed by: Fletcher Floyd  Date:    08/22/2023  Time:    07:34               Narrative:    EXAMINATION:  US LOWER EXTREMITY VEINS RIGHT    CLINICAL HISTORY:  Pain in right lower leg    TECHNIQUE:  Duplex and color flow Doppler and dynamic compression was performed of the veins was performed.    COMPARISON:  1 August 2023    FINDINGS:  No evidence of echogenic, non-compressible thrombus seen in the visualized veins of the extremities.  Color Doppler venous waveform pattern is within normal limits.                                       Medications   lactated ringers bolus 1,000 mL (0 mLs Intravenous Stopped 8/21/23 6624)   orphenadrine injection 60 mg (60 mg Intravenous Given 8/21/23 2943)   ketorolac injection 15 mg (15 mg Intravenous Given 8/22/23 0019)   oxyCODONE-acetaminophen 5-325 mg per tablet 1 tablet (1 tablet Oral Given 8/22/23 0113)   morphine injection 4 mg (4 mg Intravenous Given 8/22/23 0146)     Medical Decision Making  The patient states he was seen here on the 29th and after a fall. He states he fell and there was a 5 hour amount of LOC and was DX with rhabdomyolysis. He states now his hips, and legs are hurting. He notes the right leg is worse than the left.  Physical examination revealed normal heart and lung exam normal abdominal.  There is tenderness on burning in the muscle of the right lower extremity and right foot.    Amount and/or Complexity of Data Reviewed  Labs: ordered. Decision-making details documented in ED Course.  Radiology: ordered. Decision-making details documented in ED Course.  Discussion of management or test interpretation with external provider(s): The patient came with extensive chronic pain of his right lower extremity.  Workup is negative including normal CBC and normal CMP.  Venous and arterial Doppler study  of right lower extremity is negative.  I feel the patient is complaining of CRPS.  Discharge the patient home on pain medication and Lyrica or gabapentin.  I will suggest the patient to follow up with his primary care physician and probably needs a referral to pain treatment Center.    Risk  Prescription drug management.              Attending Attestation:           Physician Attestation for Scribe:  Physician Attestation Statement for Scribe #1: I, William Bradford MD, reviewed documentation, as scribed by Treva Monge in my presence, and it is both accurate and complete.                             Clinical Impression:   Final diagnoses:  [M79.661, M79.89] Pain and swelling of lower leg, right  [G90.521] Complex regional pain syndrome type 1 of right lower extremity (Primary)        ED Disposition Condition    Discharge Stable          ED Prescriptions       Medication Sig Dispense Start Date End Date Auth. Provider    HYDROcodone-acetaminophen (NORCO) 5-325 mg per tablet Take 1 tablet by mouth every 6 (six) hours as needed for Pain. 12 tablet 8/22/2023 -- William Bradford MD    pregabalin (LYRICA) 75 MG capsule Take 1 capsule (75 mg total) by mouth 2 (two) times daily. 60 capsule 8/22/2023 2/20/2024 William Bradford MD          Follow-up Information    None          William Bradford MD  08/24/23 2582

## 2023-11-06 PROBLEM — T83.511A CATHETER-ASSOCIATED URINARY TRACT INFECTION: Status: RESOLVED | Noted: 2023-08-03 | Resolved: 2023-11-06

## 2023-11-06 PROBLEM — N39.0 CATHETER-ASSOCIATED URINARY TRACT INFECTION: Status: RESOLVED | Noted: 2023-08-03 | Resolved: 2023-11-06
